# Patient Record
Sex: FEMALE | Race: WHITE | Employment: PART TIME | URBAN - METROPOLITAN AREA
[De-identification: names, ages, dates, MRNs, and addresses within clinical notes are randomized per-mention and may not be internally consistent; named-entity substitution may affect disease eponyms.]

---

## 2021-02-18 ENCOUNTER — IMMUNIZATION (OUTPATIENT)
Dept: FAMILY MEDICINE | Facility: CLINIC | Age: 31
End: 2021-02-18
Payer: COMMERCIAL

## 2021-02-18 PROCEDURE — 0001A PR COVID VAC PFIZER DIL RECON 30 MCG/0.3 ML IM: CPT

## 2021-02-18 PROCEDURE — 91300 PR COVID VAC PFIZER DIL RECON 30 MCG/0.3 ML IM: CPT

## 2021-02-28 ENCOUNTER — HEALTH MAINTENANCE LETTER (OUTPATIENT)
Age: 31
End: 2021-02-28

## 2021-10-03 ENCOUNTER — HEALTH MAINTENANCE LETTER (OUTPATIENT)
Age: 31
End: 2021-10-03

## 2022-03-20 ENCOUNTER — HEALTH MAINTENANCE LETTER (OUTPATIENT)
Age: 32
End: 2022-03-20

## 2022-04-07 ENCOUNTER — TRANSFERRED RECORDS (OUTPATIENT)
Dept: MULTI SPECIALTY CLINIC | Facility: CLINIC | Age: 32
End: 2022-04-07

## 2022-04-07 LAB
HPV ABSTRACT: NORMAL
PAP-ABSTRACT: NORMAL

## 2022-09-10 ENCOUNTER — HEALTH MAINTENANCE LETTER (OUTPATIENT)
Age: 32
End: 2022-09-10

## 2023-04-30 ENCOUNTER — HEALTH MAINTENANCE LETTER (OUTPATIENT)
Age: 33
End: 2023-04-30

## 2023-10-09 ENCOUNTER — VIRTUAL VISIT (OUTPATIENT)
Dept: FAMILY MEDICINE | Facility: CLINIC | Age: 33
End: 2023-10-09
Payer: COMMERCIAL

## 2023-10-09 VITALS — HEIGHT: 60 IN

## 2023-10-09 DIAGNOSIS — Z34.90 SUPERVISION OF NORMAL PREGNANCY: Primary | ICD-10-CM

## 2023-10-09 PROCEDURE — 99207 PR NO CHARGE NURSE ONLY: CPT | Mod: 93

## 2023-10-09 RX ORDER — ALPRAZOLAM 0.25 MG
1 TABLET ORAL
COMMUNITY
Start: 2023-08-08 | End: 2023-11-13

## 2023-10-09 RX ORDER — CHOLECALCIFEROL (VITAMIN D3) 1250 MCG
CAPSULE ORAL
COMMUNITY
Start: 2023-03-13 | End: 2023-11-13

## 2023-10-09 RX ORDER — ONDANSETRON 4 MG/1
4 TABLET, FILM COATED ORAL
COMMUNITY
Start: 2023-01-05 | End: 2023-11-13

## 2023-10-09 RX ORDER — LOSARTAN POTASSIUM 50 MG/1
1 TABLET ORAL
COMMUNITY
Start: 2023-07-10 | End: 2023-11-13

## 2023-10-09 RX ORDER — PROPRANOLOL HYDROCHLORIDE 10 MG/1
1 TABLET ORAL
COMMUNITY
Start: 2023-07-03 | End: 2023-11-13

## 2023-10-09 RX ORDER — BUPROPION HYDROCHLORIDE 75 MG/1
75 TABLET ORAL
COMMUNITY
Start: 2023-03-03 | End: 2023-11-13

## 2023-10-09 RX ORDER — NIFEDIPINE 30 MG/1
30 TABLET, EXTENDED RELEASE ORAL
COMMUNITY
Start: 2023-08-03 | End: 2024-01-17

## 2023-10-09 RX ORDER — ATENOLOL 25 MG/1
1 TABLET ORAL DAILY
COMMUNITY
Start: 2023-07-27 | End: 2024-01-17

## 2023-10-09 RX ORDER — FLUOXETINE 40 MG/1
1 CAPSULE ORAL EVERY MORNING
COMMUNITY
Start: 2023-01-05

## 2023-10-09 NOTE — PATIENT INSTRUCTIONS
Learning About Pregnancy  Your Care Instructions     Your health in the early weeks of your pregnancy is particularly important for your baby's health. Take good care of yourself. Anything you do that harms your body can also harm your baby.  Make sure to go to all of your doctor appointments. Regular checkups will help keep you and your baby healthy.  How can you care for yourself at home?  Diet    Eat a balanced diet. Make sure your diet includes plenty of beans, peas, and leafy green vegetables.     Do not skip meals or go for many hours without eating. If you are nauseated, try to eat a small, healthy snack every 2 to 3 hours.     Do not eat fish that has a high level of mercury, such as shark, swordfish, or mackerel. Do not eat more than one can of tuna each week.     Drink plenty of fluids. If you have kidney, heart, or liver disease and have to limit fluids, talk with your doctor before you increase the amount of fluids you drink.     Cut down on caffeine, such as coffee, tea, and cola.     Do not drink alcohol, such as beer, wine, or hard liquor.     Take a multivitamin that contains at least 400 micrograms (mcg) of folic acid to help prevent birth defects. Fortified cereal and whole wheat bread are good additional sources of folic acid.     Increase the calcium in your diet. Try to drink a quart of skim milk each day. You may also take calcium supplements and choose foods such as cheese and yogurt.   Lifestyle    Make sure you go to your follow-up appointments.     Get plenty of rest. You may be unusually tired while you are pregnant.     Get at least 30 minutes of exercise on most days of the week. Walking is a good choice. If you have not exercised in the past, start out slowly. Take several short walks each day.     Do not smoke. If you need help quitting, talk to your doctor about stop-smoking programs. These can increase your chances of quitting for good.     Do not touch cat feces or litter boxes.  Also, wash your hands after you handle raw meat, and fully cook all meat before you eat it. Wear gloves when you work in the yard or garden, and wash your hands well when you are done. Cat feces, raw or undercooked meat, and contaminated dirt can cause an infection that may harm your baby or lead to a miscarriage.     Avoid things that can make your body too hot and may be harmful to your baby, such as a hot tub or sauna. Or talk with your doctor before doing anything that raises your body temperature. Your doctor can tell you if it's safe.     Avoid chemical fumes, paint fumes, or poisons.     Do not use illegal drugs, marijuana, or alcohol.   Medicines    Review all of your medicines with your doctor. Some of your routine medicines may need to be changed to protect your baby.     Use acetaminophen (Tylenol) to relieve minor problems, such as a mild headache or backache or a mild fever with cold symptoms. Do not use nonsteroidal anti-inflammatory drugs (NSAIDs), such as ibuprofen (Advil, Motrin) or naproxen (Aleve), unless your doctor says it is okay.     Do not take two or more pain medicines at the same time unless the doctor told you to. Many pain medicines have acetaminophen, which is Tylenol. Too much acetaminophen (Tylenol) can be harmful.     Take your medicines exactly as prescribed. Call your doctor if you think you are having a problem with your medicine.   To manage morning sickness    If you feel sick when you first wake up, try eating a small snack (such as crackers) before you get out of bed. Allow some time to digest the snack, and then get out of bed slowly.     Do not skip meals or go for long periods without eating. An empty stomach can make nausea worse.     Eat small, frequent meals instead of three large meals each day.     Drink plenty of fluids.     Eat foods that are high in protein but low in fat.     If you are taking iron supplements, ask your doctor if they are necessary. Iron can make  "nausea worse.     Avoid any smells, such as coffee, that make you feel sick.     Get lots of rest. Morning sickness may be worse when you are tired.   Follow-up care is a key part of your treatment and safety. Be sure to make and go to all appointments, and call your doctor if you are having problems. It's also a good idea to know your test results and keep a list of the medicines you take.  Where can you learn more?  Go to https://www.Modulus.net/patiented  Enter E868 in the search box to learn more about \"Learning About Pregnancy.\"  Current as of: November 9, 2022               Content Version: 13.7    1800-4907 [employer].   Care instructions adapted under license by your healthcare professional. If you have questions about a medical condition or this instruction, always ask your healthcare professional. [employer] disclaims any warranty or liability for your use of this information.      Weeks 6 to 10 of Your Pregnancy: Care Instructions  During these weeks of pregnancy, your body goes through many changes. You may start to feel different, both in your body and your emotions. Each pregnancy is different, so there's no \"right\" way to feel. These early weeks are a time to make healthy choices for you and your pregnancy.    Take a daily prenatal vitamin. Choose one with folic acid in it.   Avoid alcohol, tobacco, and drugs (including marijuana). If you need help quitting, talk to your doctor.     Drink plenty of liquids.  Be sure to drink enough water. And limit sodas, other sweetened drinks, and caffeine.     Choose foods that are good sources of calcium, iron, and folate.  You can try dairy products, dark leafy greens, fortified orange juice and cereals, almonds, broccoli, dried fruit, and beans.     Avoid foods that may be harmful.  Don't eat raw meat, deli meat, raw seafood, or raw eggs. Avoid soft cheese and unpasteurized dairy, like Brie and blue cheese. And don't eat fish that " "contains a lot of mercury, like shark and swordfish.     Don't touch olu litter or cat poop.  They can cause an infection that could be harmful during pregnancy.     Avoid things that can make your body too hot.  For example, avoid hot tubs and saunas.     Soothe morning sickness.  Try eating 5 or 6 small meals a day, getting some fresh air, or using christa to control symptoms.     Ask your doctor about flu and COVID-19 shots.  Getting them can help protect against infection.   Follow-up care is a key part of your treatment and safety. Be sure to make and go to all appointments, and call your doctor if you are having problems. It's also a good idea to know your test results and keep a list of the medicines you take.  Where can you learn more?  Go to https://[email or web address].Garmor/patiented  Enter G112 in the search box to learn more about \"Weeks 6 to 10 of Your Pregnancy: Care Instructions.\"  Current as of: November 9, 2022               Content Version: 13.7 2006-2023 [employer].   Care instructions adapted under license by your healthcare professional. If you have questions about a medical condition or this instruction, always ask your healthcare professional. [employer] disclaims any warranty or liability for your use of this information.         Managing Morning Sickness (01:55)  Your health professional recommends that you watch this short online health video.  Learn tips for dealing with morning sickness, no matter what time of day you have it.  Purpose:  Gives tips for managing morning sickness, including eating small low-fat meals and avoiding caffeine and spicy food.  Goal:  The user will learn tips for dealing with morning sickness during pregnancy.     How to watch the video    Scan the QR code   OR Visit the website    [email or web address]   Current as of: November 9, 2022               Content Version: 13.7 2006-2023 [employer].   Care instructions " adapted under license by your healthcare professional. If you have questions about a medical condition or this instruction, always ask your healthcare professional. Healthwise, [employer] disclaims any warranty or liability for your use of this information.      Pregnancy and Heartburn: Care Instructions  Overview     Heartburn is a common problem during pregnancy.  Heartburn happens when stomach acid backs up into the tube that carries food to the stomach. This tube is called the esophagus. Early in pregnancy, heartburn is caused by hormone changes that slow down digestion. Later on, it's also caused by the large uterus pushing up on the stomach.  Even though you can't fix the cause, there are things you can do to get relief. Treating heartburn during pregnancy focuses first on making lifestyle changes, like changing what and how you eat, and on taking medicines.  Heartburn usually improves or goes away after childbirth.  Follow-up care is a key part of your treatment and safety. Be sure to make and go to all appointments, and call your doctor if you are having problems. It's also a good idea to know your test results and keep a list of the medicines you take.  How can you care for yourself at home?  Eat small, frequent meals.  Avoid foods that make your symptoms worse, such as chocolate, peppermint, and spicy foods. Avoid drinks with caffeine, such as coffee, tea, and sodas.  Avoid bending over or lying down after meals.  Take a short walk after you eat.  If heartburn is a problem at night, do not eat for 2 hours before bedtime.  Take antacids like Mylanta, Maalox, Rolaids, or Tums. Do not take antacids that have sodium bicarbonate, magnesium trisilicate, or aspirin. Be careful when you take over-the-counter antacid medicines. Many of these medicines have aspirin in them. While you are pregnant, do not take aspirin or medicines that contain aspirin unless your doctor says it is okay.  If you're not getting  "relief, talk to your doctor. You may be able to take a stronger acid-reducing medicine.  When should you call for help?   Call your doctor now or seek immediate medical care if:    You have new or worse belly pain.     You are vomiting.   Watch closely for changes in your health, and be sure to contact your doctor if:    You have new or worse symptoms of reflux.     You are losing weight.     You have trouble or pain swallowing.     You do not get better as expected.   Where can you learn more?  Go to https://www.Zannel.net/patiented  Enter U946 in the search box to learn more about \"Pregnancy and Heartburn: Care Instructions.\"  Current as of: November 9, 2022               Content Version: 13.7 2006-2023 [employer].   Care instructions adapted under license by your healthcare professional. If you have questions about a medical condition or this instruction, always ask your healthcare professional. [employer] disclaims any warranty or liability for your use of this information.      Constipation: Care Instructions  Overview     Constipation means that you have a hard time passing stools (bowel movements). People pass stools from 3 times a day to once every 3 days. What is normal for you may be different. Constipation may occur with pain in the rectum and cramping. The pain may get worse when you try to pass stools. Sometimes there are small amounts of bright red blood on toilet paper or the surface of stools. This is because of enlarged veins near the rectum (hemorrhoids).  A few changes in your diet and lifestyle may help you avoid ongoing constipation. Your doctor may also prescribe medicine to help loosen your stool.  Some medicines can cause constipation. These include pain medicines and antidepressants. Tell your doctor about all the medicines you take. Your doctor may want to make a medicine change to ease your symptoms.  Follow-up care is a key part of your treatment and " "safety. Be sure to make and go to all appointments, and call your doctor if you are having problems. It's also a good idea to know your test results and keep a list of the medicines you take.  How can you care for yourself at home?  Drink plenty of fluids. If you have kidney, heart, or liver disease and have to limit fluids, talk with your doctor before you increase the amount of fluids you drink.  Include high-fiber foods in your diet each day. These include fruits, vegetables, beans, and whole grains.  Get at least 30 minutes of exercise on most days of the week. Walking is a good choice. You also may want to do other activities, such as running, swimming, cycling, or playing tennis or team sports.  Take a fiber supplement, such as Citrucel or Metamucil, every day. Read and follow all instructions on the label.  Schedule time each day for a bowel movement. A daily routine may help. Take your time having a bowel movement, but don't sit for more than 10 minutes at a time. And don't strain too much.  Support your feet with a small step stool when you sit on the toilet. This helps flex your hips and places your pelvis in a squatting position.  Your doctor may recommend an over-the-counter laxative to relieve your constipation. Examples are Milk of Magnesia and MiraLax. Read and follow all instructions on the label. Do not use laxatives on a long-term basis.  When should you call for help?   Call your doctor now or seek immediate medical care if:    You have new or worse belly pain.     You have new or worse nausea or vomiting.     You have blood in your stools.   Watch closely for changes in your health, and be sure to contact your doctor if:    Your constipation is getting worse.     You do not get better as expected.   Where can you learn more?  Go to https://www.healthwise.net/patiented  Enter P343 in the search box to learn more about \"Constipation: Care Instructions.\"  Current as of: March 22, " "2023               Content Version: 13.7 2006-2023 [employer].   Care instructions adapted under license by your healthcare professional. If you have questions about a medical condition or this instruction, always ask your healthcare professional. [employer] disclaims any warranty or liability for your use of this information.      Learning About High-Iron Foods  What foods are high in iron?     The foods you eat contain nutrients, such as vitamins and minerals. Iron is a nutrient. Your body needs the right amount to stay healthy and work as it should. You can use the list below to help you make choices about which foods to eat.  Here are some foods that contain iron. They have 1 to 2 milligrams of iron per serving.  Fruits  Figs (dried), 5 figs  Vegetables  Asparagus (canned), 6 henderson  Amadeo, beet, Swiss chard, or turnip greens, 1 cup  Dried peas, cooked,   cup  Seaweed, spirulina (dried),   cup  Spinach, (cooked)   cup or (raw) 1 cup  Grains  Cereals, fortified with iron, 1 cup  Grits (instant, cooked), fortified with iron,   cup  Meats and other protein foods  Beans (kidney, lima, navy, white), canned or cooked,   cup  Beef or lamb, 3 oz  Chicken giblets, 3 oz  Chickpeas (garbanzo beans),   cup  Liver of beef, lamb, or pork, 3 oz  Oysters (cooked), 3 oz  Sardines (canned), 3 oz  Soybeans (boiled),   cup  Tofu (firm),   cup  Work with your doctor to find out how much of this nutrient you need. Depending on your health, you may need more or less of it in your diet.  Where can you learn more?  Go to [email or web address]  Enter R005 in the search box to learn more about \"Learning About High-Iron Foods.\"  Current as of: March 1, 2023               Content Version: 13.7 2006-2023 [employer].   Care instructions adapted under license by your healthcare professional. If you have questions about a medical condition or this instruction, always ask your " "healthcare professional. [employer], [facility] disclaims any warranty or liability for your use of this information.      Rh Antibodies Screening During Pregnancy: About This Test  What is it?     The Rh antibodies screening test is a blood test. It checks your blood for Rh antibodies. If you have Rh-negative blood and have been exposed to Rh-positive blood, your immune system may make antibodies to attack the Rh-positive blood. When a pregnant woman has these antibodies, it is called Rh sensitization.  Why is this test done?  The Rh antibodies screening test is done during pregnancy to find out if your baby is at risk for Rh disease. This can happen if you have Rh-negative blood and your baby has Rh-positive blood. If your Rh-negative blood mixes with Rh-positive blood, your immune system will make antibodies to attack the Rh-positive blood.  During pregnancy, these antibodies could attach to the baby's red blood cells. This can cause your baby to have serious health problems. The results of this test will help your doctor know how to best care for you and your baby during your pregnancy.  How do you prepare for the test?  In general, there's nothing you have to do before this test, unless your doctor tells you to.  How is the test done?  A health professional uses a needle to take a blood sample, usually from the arm.  What happens after the test?  You will probably be able to go home right away. It depends on the reason for the test.  You can go back to your usual activities right away.  Follow-up care is a key part of your treatment and safety. Be sure to make and go to all appointments, and call your doctor if you are having problems. It's also a good idea to keep a list of the medicines you take. Ask your doctor when you can expect to have your test results.  Where can you learn more?  Go to https://www.[email or web address] Advisor.net/patiented  Enter P722 in the search box to learn more about \"Rh Antibodies Screening " "During Pregnancy: About This Test.\"  Current as of: 2022               Content Version: 13.7    4696-3021 [employer].   Care instructions adapted under license by your healthcare professional. If you have questions about a medical condition or this instruction, always ask your healthcare professional. [employer] disclaims any warranty or liability for your use of this information.      Learning About Preventing Rh Disease  What is Rh disease?     Rh disease can be a serious problem in pregnancy. It happens when substances called antibodies in the mother's blood cause red blood cells in her baby's blood to be destroyed. This can occur when the blood types of a mother and her baby do not match.  All blood has an Rh factor. This is what makes a blood type positive or negative. When you are Rh-negative, your baby may be Rh-negative or Rh-positive. If your baby has Rh-positive blood and it mixes with yours, your body will make antibodies. This is called Rh sensitization.  Most of the time, this is not a problem in a first pregnancy. But in future pregnancies, it could cause Rh disease.  A  with Rh disease has mild anemia and may have jaundice. In severe cases, anemia, jaundice, and swelling can be very dangerous or fatal. Some babies need to be delivered early. Some need special care in the NICU. A very sick baby will need a blood transfusion before or after birth.  Fortunately, Rh sensitization is usually easy to prevent.  That's why it's important to get your Rh status checked in your first trimester. It doesn't cause any warning signs. A blood test is the only way to know if you are Rh-sensitive or are at risk for it.  How can you prevent Rh disease?  If you are Rh-negative, your doctor gives you an Rh immune globulin shot (such as RhoGAM). It helps prevent your body from making the antibodies that attack your baby's red blood cells.  Timing is important. You need the " "shot at certain times during your pregnancy. And you need one anytime there is a chance that your baby's blood might mix with yours. That can happen with certain prenatal tests or when you have pregnancy bleeding, such as:  Right after any pregnancy loss, amniocentesis, or CVS testing.  After turning of a breech baby.  Before and maybe after childbirth. Your doctor gives you a shot around week 28. If your  is Rh-positive, you will have another shot.  Follow-up care is a key part of your treatment and safety. Be sure to make and go to all appointments, and call your doctor if you are having problems. It's also a good idea to know your test results and keep a list of the medicines you take.  Where can you learn more?  Go to https://www.hyaqu.[email or web address]/patiented  Enter W177 in the search box to learn more about \"Learning About Preventing Rh Disease.\"  Current as of: 2022               Content Version: 13.7    4683-5098 [employer].   Care instructions adapted under license by your healthcare professional. If you have questions about a medical condition or this instruction, always ask your healthcare professional. [employer] disclaims any warranty or liability for your use of this information.      Learning About Rh Immunoglobulin Shots  Introduction     An Rh immunoglobulin shot is given to pregnant women who have Rh-negative blood.  You may have Rh-negative blood, and your baby may have Rh-positive blood. If the two types of blood mix, your body will make antibodies. This is called Rh sensitization. Most of the time, this is not a problem the first time you're pregnant. But it could cause problems in future pregnancies.  This shot keeps your body from making the antibodies. You get the shot around 28 weeks of pregnancy. After the birth, your baby's blood is tested. If the blood is Rh positive, you will get another shot. You may also get the shot if you have vaginal bleeding " "while you are pregnant or if you have a miscarriage. These shots protect future pregnancies.  Women with Rh negative blood will need this shot each time they get pregnant.  Example  Rh immunoglobulin (HypRho-D, MICRhoGAM, and RhoGAM)  Possible side effects  Rare side effects may include:  Some mild pain where you got the shot.  A slight fever.  An allergic reaction.  You may have other side effects not listed here. Check the information that comes with your medicine.  What to know about taking this medicine  You may need more than one shot. You may need the shot again:  After amniocentesis, fetal blood sampling, or chorionic villus sampling tests.  If you have bleeding in your second or third trimester.  After turning of a breech baby.  After an injury to the belly while you are pregnant.  After a miscarriage or an .  Before or right after treatment for an ectopic or a partial molar pregnancy.  Tell your doctor if you have any allergies or have had a bad response to medicines in the past.  If you get this shot within 3 months of getting a live-virus vaccine, the vaccine may not work. Your doctor will tell you if you need more vaccine.  Check with your doctor or pharmacist before you use any other medicines. This includes over-the-counter medicines. Make sure your doctor knows all of the medicines, vitamins, herbs, and supplements you take. Taking some medicines at the same time can cause problems.  Where can you learn more?  Go to https://[email or web address].[email or web address].net/patiented  Enter V615 in the search box to learn more about \"Learning About Rh Immunoglobulin Shots.\"  Current as of: 2022               Content Version: 13.7    6310-9871 [email or web address].   Care instructions adapted under license by your healthcare professional. If you have questions about a medical condition or this instruction, always ask your healthcare professional. [email or web address] disclaims any warranty or liability for " your use of this information.      Rubella (Thai Measles): Care Instructions  Overview  Rubella, also called Thai measles or 3-day measles, is a disease caused by a virus. It spreads by coughs, sneezes, and close contact. Rubella usually is mild and does not cause long-term problems. But if you are pregnant and get it, you can give the disease to your unborn baby. This can cause serious birth defects.  While you have rubella, you may get a rash and a mild fever, and the lymph glands in your neck may swell. Older children often have a fever, eye pain, a sore throat, and body aches. You can relieve most symptoms with care at home. Avoid being around others, especially pregnant people, until your rash has been gone for at least 4 days. People who have not had this disease before or have not had the vaccine have the greatest chance of getting the virus.  Follow-up care is a key part of your treatment and safety. Be sure to make and go to all appointments, and call your doctor if you are having problems. It's also a good idea to know your test results and keep a list of the medicines you take.  How can you care for yourself at home?  Drink plenty of fluids. If you have kidney, heart, or liver disease and have to limit fluids, talk with your doctor before you increase the amount of fluids you drink.  Get plenty of rest to help your body heal.  Take an over-the-counter pain medicine, such as acetaminophen (Tylenol), ibuprofen (Advil, Motrin), or naproxen (Aleve), to reduce fever and discomfort. Read and follow all instructions on the label. Do not give aspirin to anyone younger than 20. It has been linked to Reye syndrome, a serious illness.  Do not take two or more pain medicines at the same time unless the doctor told you to. Many pain medicines have acetaminophen, which is Tylenol. Too much acetaminophen (Tylenol) can be harmful.  Try not to scratch the rash. Put cold, wet cloths on the rash to reduce itching.  Do  "not smoke. Smoking can make your symptoms worse. If you need help quitting, talk to your doctor about stop-smoking programs and medicines. These can increase your chances of quitting for good.  Avoid contact with people who have never had rubella and who have not been immunized.  When should you call for help?   Call your doctor now or seek immediate medical care if:    You have a fever with a stiff neck or a severe headache.     You are sensitive to light or feel very sleepy or confused.   Watch closely for changes in your health, and be sure to contact your doctor if:    You do not get better as expected.   Where can you learn more?  Go to https://www.Aptito.net/patiented  Enter B812 in the search box to learn more about \"Rubella (Hebrew Measles): Care Instructions.\"  Current as of: 2022               Content Version: 13.7    4457-1907 [employer].   Care instructions adapted under license by your healthcare professional. If you have questions about a medical condition or this instruction, always ask your healthcare professional. [employer] disclaims any warranty or liability for your use of this information.      Gonorrhea and Chlamydia: About These Tests  What is it?  These tests use a sample of urine or other body fluid to look for the bacteria that cause these sexually transmitted infections (STIs). The fluid sample can come from the cervix, vagina, rectum, throat, or eyes.  Why is this test done?  These tests may be done to:  Find out if symptoms are caused by gonorrhea or chlamydia.  Check people who are at high risk of being infected with gonorrhea or chlamydia.  Retest people several months after they have been treated for gonorrhea or chlamydia.  Check for infection in your  if you had a gonorrhea or chlamydia infection at the time of delivery.  How can you prepare for the test?  If you are going to have a urine test, do not urinate for at least 1 hour " "before the test.  If you think you may have chlamydia or gonorrhea, don't have sexual intercourse until you get your test results. And you may want to have tests for other STIs, such as HIV.  How is the test done?  For a direct sample, a swab is used to collect body fluid from the cervix, vagina, rectum, throat, or eyes. Your doctor may collect the sample. Or you may be given instructions on how to collect your own sample.  For a urine sample, you will collect the urine that comes out when you first start to urinate. Don't wipe the genital area clean before you urinate.  How long does the test take?  The test will take a few minutes.  What happens after the test?  You will be able to go home right away.  You can go back to your usual activities right away.  If you do have an infection, don't have sexual intercourse for 7 days after you start treatment. And your sex partner(s) should also be treated.  Follow-up care is a key part of your treatment and safety. Be sure to make and go to all appointments, and call your doctor if you are having problems. It's also a good idea to keep a list of the medicines you take. Ask your doctor when you can expect to have your test results.  Where can you learn more?  Go to [email or web address]://[email or web address]  Enter K976 in the search box to learn more about \"Gonorrhea and Chlamydia: About These Tests.\"  Current as of: August 2, 2022               Content Version: 13.7    8032-4058 [employer].   Care instructions adapted under license by your healthcare professional. If you have questions about a medical condition or this instruction, always ask your healthcare professional. [employer] disclaims any warranty or liability for your use of this information.      Trichomoniasis: About This Test  What is it?     This test uses a sample of urine or other body fluid to look for the tiny parasite that causes trichomoniasis (also called trich). The fluid sample " can come from the vagina, cervix, or urethra. Your doctor may choose to use one or more of many available tests.  Why is it done?  A trich test may be done to:  Find out if symptoms are caused by trich.  Check people who are at high risk for being infected with trich.  Check after treatment to make sure that the infection is gone.  How do you prepare for the test?  If you are going to have a urine test, do not urinate for at least 1 hour before the test.  How is the test done?  For a direct sample, a swab is used to collect body fluid from the cervix, vagina, or urethra. Your doctor may collect the sample. Or you may be given instructions on how to collect your own sample.  For a urine sample, you will collect the urine that comes out when you first start to urinate. Don't wipe the area clean before you urinate.  How long does the test take?  It will take a few minutes to collect a sample.  What happens after the test?  You can go home right away.  You can go back to your usual activities right away.  You may get the test results the same day or several days later. It depends on the test used.  If you do have an infection, don't have sexual intercourse for 7 days after you start treatment. Your sex partner(s) should also be treated.  Follow-up care is a key part of your treatment and safety. Be sure to make and go to all appointments, and call your doctor if you are having problems. Ask your doctor when you can expect to have your test results.  Current as of: August 2, 2022               Content Version: 13.7    7199-0606 [employer].   Care instructions adapted under license by your healthcare professional. If you have questions about a medical condition or this instruction, always ask your healthcare professional. [employer] disclaims any warranty or liability for your use of this information.      HIV Testing: Care Instructions  Overview     You can get tested for the human  "immunodeficiency virus (HIV). This test checks for HIV antibodies and antigens in your blood. If they are found, the test is positive.  If HIV antibodies or antigens are not found, you may need a repeat test to be sure the results are correct. Or your doctor may want to do a different test.  Follow-up care is a key part of your treatment and safety. Be sure to make and go to all appointments, and call your doctor if you are having problems. It's also a good idea to know your test results and keep a list of the medicines you take.  What do the results mean?  Normal result  A normal result means that no HIV antibodies or antigens were found in your blood. And if you had a test that checked for HIV RNA or DNA, none was found. Normal results are called negative.  You may need more testing to be sure the test results are correct.  Uncertain result  Test results don't clearly show whether you have an HIV infection. This is usually called an indeterminate result. This may happen before HIV antibodies or antigens develop. Or it may happen when some other type of antibody or antigen interferes with the results. If this occurs, you will probably have another test right away.  Abnormal result  An abnormal result means that you have HIV antibodies or antigens in your blood. These results are called positive.  A positive test will be confirmed by another type of test. This is because some tests can cause false-positive results. No one is considered HIV-positive until the result is confirmed by a test that shows HIV RNA or DNA in the person's blood.  If your test result is positive, your doctor will talk to you about starting treatment.  Where can you learn more?  Go to https://www.[employer].net/patiented  Enter T792 in the search box to learn more about \"HIV Testing: Care Instructions.\"  Current as of: October 31, 2022               Content Version: 13.7    4810-1914 [employer].   Care instructions adapted under " license by your healthcare professional. If you have questions about a medical condition or this instruction, always ask your healthcare professional. [employer] disclaims any warranty or liability for your use of this information.      Hepatitis C Virus Tests: About These Tests  What are they?     Hepatitis C virus tests are blood tests that check for substances in the blood that show whether you have hepatitis C now or had it in the past. The tests can also tell you what type of hepatitis C you have and how severe the disease is. This can help your doctor with treatment.  If the tests show that you have long-term hepatitis C, you need to take steps to prevent spreading the disease.  Why are these tests done?  You may need these tests if:  You have symptoms of hepatitis.  You may have been exposed to the virus. You are more likely to have been exposed to the virus if you inject drugs or are exposed to body fluids (such as if you are a health care worker).  You've had other tests that show you have liver problems.  You are 18 to 79 years old.  You have an HIV infection.  The tests also are done to help your doctor decide about your treatment and see how well it works.  How do you prepare for the test?  In general, there's nothing you have to do before this test, unless your doctor tells you to.  How is the test done?  A health professional uses a needle to take a blood sample, usually from the arm.  What happens after these tests?  You will probably be able to go home right away.  You can go back to your usual activities right away.  Follow-up care is a key part of your treatment and safety. Be sure to make and go to all appointments, and call your doctor if you are having problems. It's also a good idea to keep a list of the medicines you take. Ask your doctor when you can expect to have your test results.  Where can you learn more?  Go to https://www.[employer]ia.net/patiented  Enter W551 in the search  "box to learn more about \"Hepatitis C Virus Tests: About These Tests.\"  Current as of: October 31, 2022               Content Version: 13.7    8162-1923 [employer].   Care instructions adapted under license by your healthcare professional. If you have questions about a medical condition or this instruction, always ask your healthcare professional. [employer] disclaims any warranty or liability for your use of this information.      Learning About Fetal Ultrasound Results  What is a fetal ultrasound?     Fetal ultrasound is a test that lets your doctor see an image of your baby. Your doctor learns information about your baby from this picture. You may find out, for example, if you are having a boy or a girl. But the main reason you have this test is to get information about your baby's health.  (You may hear your baby called a fetus. This is a common medical term for a baby that's growing in the mother's uterus.)  What kind of information can you learn from this test?  The findings of an ultrasound fall into two categories, normal and abnormal.  Normal  The fetus is the right size for its age.  The placenta is the expected size and does not cover the cervix.  There is enough amniotic fluid in the uterus.  No birth defects can be seen.  Abnormal  The fetus is small or large for its age.  The placenta covers the cervix.  There is too much or too little amniotic fluid in the uterus.  The fetus may have a birth defect.  What does an abnormal result mean?  Abnormal seems to imply that something is wrong with your baby. But what it means is that the test has shown something the doctor wants to take a closer look at.  And that's what happens next. Your doctor will talk to you about what further test or tests you may need.  What do the results mean?  Some of the things your doctor may see on an abnormal ultrasound include:  Echogenic bowel.  The bowel looks very bright on the screen. This could " mean that there's blood in the bowel. Or it could mean that something is blocking the small bowel.  Increased nuchal translucency.  The ultrasound measures the thickness at the back of the baby's neck. An increase in thickness is sometimes an early sign of Down syndrome.  Increased or decreased amniotic fluid.  The doctor will look for a reason for the level of amniotic fluid and will watch the pregnancy closely as it progresses.  Large ventricles.  Ventricles in the brain look larger than they should. Your doctor may take a closer look at the brain.  Renal pyelectasis/hydronephrosis.  The ultrasound measures the fluid around the kidney. If there is more fluid than expected, there is a chance of urinary tract or kidney problems.  Short long bones.  The ultrasound measures certain arm and leg bones. A long bone (humerus or femur) that is shorter than average could be a sign of Down syndrome.  Subchorionic hemorrhage.  An ultrasound can show bleeding under one of the membranes that surrounds the fetus. Some women don't have symptoms of bleeding. The ultrasound can find this problem when women are not bleeding from their vagina. Women who have this condition have a slightly higher chance of miscarriage.  What do you do now?  Take a deep breath, and let it out. Keep in mind that an abnormal finding on an ultrasound, after it's coupled with more information, may:  Turn out to be nothing.  Turn out to be something mild that won't affect the baby.  Turn out to be something more serious. But if this happens, early diagnosis helps you and your doctor plan treatment options sooner rather than later.  Your medical team is there for you. So are your family and friends. Ask questions, and get the help and support you need.  Follow-up care is a key part of your treatment and safety. Be sure to make and go to all appointments, and call your doctor if you are having problems. It's also a good idea to know your test results and keep  "a list of the medicines you take.  Where can you learn more?  Go to https://www.Stepsss.net/patiented  Enter K451 in the search box to learn more about \"Learning About Fetal Ultrasound Results.\"  Current as of: November 9, 2022               Content Version: 13.7    1758-2512 [employer].   Care instructions adapted under license by your healthcare professional. If you have questions about a medical condition or this instruction, always ask your healthcare professional. [employer] disclaims any warranty or liability for your use of this information.      Learning About Prenatal Visits  Your Care Instructions     Regular prenatal visits are very important during any pregnancy. These quick office visits may seem simple and routine. But they can help you and your baby stay healthy. Your doctor is watching for problems that can only be found by regularly checking you and your baby. The visits also give you and your doctor time to build a good relationship.  Many women have prenatal visits every 4 to 6 weeks until week 28 of pregnancy. Then the visits become more frequent. This is often every 2 to 3 weeks through week 36 of pregnancy. In the final month of pregnancy, you likely will see your doctor every week. You may have a different schedule if you have a medical problem or are a teen.  At different times in your pregnancy, you will have exams and tests. Some are routine. Others are done only when there is a chance of a problem. Everything healthy you do for your body helps your growing baby. Rest when you need it. Eat well, drink plenty of water, and exercise regularly.  What happens during a prenatal visit?  You will have blood pressure checks, along with urine tests. You also may have blood tests. If you need to go to the bathroom while waiting for the doctor, tell the nurse. He or she will give you a sample cup so your urine can be tested.  You will be weighed and have your belly " measured.  Your doctor may listen to your baby's heartbeat with a special stethoscope.  In your second trimester, your doctor will check your blood sugar (glucose tolerance test) for diabetes that can occur during pregnancy. This is gestational diabetes, which can harm your baby.  You will have tests to check for infections that could harm your . These include group B streptococcus and hepatitis B.  Your doctor may do ultrasounds to check for problems. This also checks your baby's position. An ultrasound uses sound waves to produce a picture of your baby.  You may have other tests at any time during your pregnancy.  Use your visits to discuss with your doctor any concerns you have.  How can you care for yourself at home?  Get plenty of rest.  Exercise every day, if your doctor says it is okay. If you have not exercised in the past, start out slowly. Take many short walks each day.  Eat a balanced diet. Make sure your diet includes plenty of beans, peas, and leafy green vegetables.  Drink plenty of fluids. Cut down on drinks with caffeine, such as coffee, tea, and cola. If you have kidney, heart, or liver disease and have to limit fluids, talk with your doctor before you increase the amount of fluids you drink.  Avoid chemical fumes, paint fumes, and poisons. Do not use alcohol, marijuana, or illegal drugs. Do not smoke, vape, or use tobacco. If you need help quitting, talk to your doctor about stop-smoking programs and medicines. These can increase your chances of quitting for good.  Review all of your medicines with your doctor. Some of your routine medicines may need to be changed to protect your baby. Do not stop or start taking any medicines without talking to your doctor first.  Follow-up care is a key part of your treatment and safety. Be sure to make and go to all appointments, and call your doctor if you are having problems. It's also a good idea to know your test results and keep a list of the  "medicines you take.  Where can you learn more?  Go to https://www.healthZalicus.net/patiented  Enter J502 in the search box to learn more about \"Learning About Prenatal Visits.\"  Current as of: November 9, 2022               Content Version: 13.7    6533-6925 [employer].   Care instructions adapted under license by your healthcare professional. If you have questions about a medical condition or this instruction, always ask your healthcare professional. [employer] disclaims any warranty or liability for your use of this information.      Domestic Abuse: Care Instructions  Overview     If you want to save this information but don't think it is safe to take it home, see if a trusted friend can keep it for you. Plan ahead. Know who you can call for help, and memorize the phone number.   Be careful online too. Your online activity may be seen by others. Do not use your personal computer or device to read about this topic. Use a safe computer such as one at work, a friend's house, or a library.    Domestic abuse is different from an argument now and then. It is a pattern of abuse that one person uses to control another person's behavior. It may start with threats and name-calling. Then, it may lead to more serious acts, like pushing and slapping. The abuse also may occur in other areas. For example, the abuser may withhold money or spend a partner's money without their knowledge.  Abuse can cause serious harm. You are more likely to have a long-term health problem from the injuries and stress of living in a violent relationship. People who are sexually abused by their partners have more sexually transmitted infections and unwanted pregnancies. Anyone can be abused in relationships. Anyone who is abused also faces emotional pain.  If you are pregnant, abuse can cause problems such as poor weight gain, infections, and bleeding. Abuse during this time may increase your baby's risk of low birth " "weight, premature birth, and death.  Follow-up care is a key part of your treatment and safety. Be sure to make and go to all appointments, and call your doctor if you are having problems. It's also a good idea to know your test results and keep a list of the medicines you take.  How can you care for yourself at home?  If you do not have a safe place to stay, discuss this with your doctor before you leave.  Have a plan for where to go, how to leave your house, and where to stay in case of an emergency. Do not tell your partner about your plan. Contact:  The National Domestic Violence Hotline toll-free at 1-916.528.6940. They can help you find resources in your area.  Your local police department, hospital, or clinic for information about shelters and safe homes near you.  Talk to a trusted friend or neighbor or a [employer] counselor. Do not feel that you have to hide what happened.  Teach your children how to call for help in an emergency.  Be alert to warning signs, such as threats, heavy alcohol use, or drug use. This can help you avoid danger.  If you can, make sure that there are no guns or other weapons in the house.  When should you call for help?   Call 911 anytime you think you may need emergency care. For example, call if:    You or someone else has just been abused.     You think you or someone else is in danger of being abused.   Watch closely for changes in your health, and be sure to contact your doctor if you have any problems.  Where can you learn more?  Go to https://www.anywayanyday.net/patiented  Enter G282 in the search box to learn more about \"Domestic Abuse: Care Instructions.\"  Current as of: February 27, 2023               Content Version: 13.7    9192-4346 [employer].   Care instructions adapted under license by your healthcare professional. If you have questions about a medical condition or this instruction, always ask your healthcare professional. Healthwise, [employer] " disclaims any warranty or liability for your use of this information.      Domestic Violence Safety Instructions: Care Instructions  Overview     If you want to save this information but don't think it is safe to take it home, see if a trusted friend can keep it for you. Plan ahead. Know who you can call for help, and memorize the phone number.   Be careful online too. Your online activity may be seen by others. Do not use your personal computer or device to read about this topic. Use a safe computer such as one at work, a friend's house, or a library.    When you are abused by a spouse or partner, you can take actions to protect yourself and your children.  You can increase your safety whether you decide to stay with your spouse or partner or you decide to leave. You can also prepare an action plan and kit ahead of time. This will allow you to leave quickly when you decide to. Remember, you cannot stop your partner's abuse, but you can find help for you and your children. No one deserves to be abused.  Follow-up care is a key part of your treatment and safety. Be sure to make and go to all appointments, and call your doctor if you are having problems. It's also a good idea to know your test results and keep a list of the medicines you take.  How can you care for yourself at home?  Make a plan for your safety   If you decide to stay with your abusive spouse or partner, you can do the following to increase your safety:  Decide what works best to keep you safe in an emergency.  Decide who you can call to help you in an emergency.  Decide if you will call the police if you get hurt again. If you can, agree on a signal with your children or neighbor to call the police for you if you need help. You can flash lights or hang something out of a window.  Choose a place to go for a short time if you need to leave home. Memorize the address and phone number.  Learn escape routes out of your home in case you need to leave in a  hurry. Teach your children different ways to get out of the house quickly if they need to.  Take objects that can be used as weapons (guns, knives, hammers) and hide them or lock them up.  Learn the number of a domestic violence shelter. Talk to the people there about how they can help.  Find out about other community resources that can help you.  Take pictures of bruises or other injuries if you can. You can also take pictures of things your abuser has broken.  Teach your children that violence is never okay. Tell them that they do not deserve to be hurt.  Pack a bag   Prepare a kit with things you will need if you leave the house suddenly. You can try to hide this in your house, or you can leave it with a friend or relative you can trust. You should include the following items in the kit:  A set of keys to your house and car.  Emergency phone numbers and addresses. You might also want to have a map and a small flashlight in case you need to leave in the night.  Money such as cash or checks. You can also ask a trusted friend or family member to hold money for you.  Copies of legal documents such as house and car titles or rent receipts, birth certificates, Social Security card, voter registration, marriage and 's licenses, and your children's health records.  Personal items you would need for a few days, such as clothes, a toothbrush, toothpaste, and any medicines you or your children need.  A favorite toy or book for your child or children.  Diapers and bottles, if you have very young children.  Pictures that show signs of abuse and violence. You may also add pictures of your abuser.  If you leave   If you decide to leave, you can take the following steps:  Go to the emergency room at a hospital if you have been hurt.  Ask the police to be with you as you leave. They can protect you as you leave the house.  If you decide to leave secretly, remember that activities can be tracked. Your abuser may still have  "access to your cell phone, email, and credit cards. It may be possible for these to be traced. Always be aware of your surroundings.  Take the kit you have prepared. If this is an emergency, do not worry about gathering up anything. Just leave--your safety is most important.  If your abuser moves out, change the locks on the doors. If you have a security system, change the access code.  When should you call for help?   Call 911 anytime you think you may need emergency care. For example, call if:    You or someone else has just been abused.     You think you or someone else is in danger of being abused.   Watch closely for changes in your health, and be sure to contact your doctor if you have any problems.  Where can you learn more?  Go to https://www.[employer].net/patiented  Enter A752 in the search box to learn more about \"Domestic Violence Safety Instructions: Care Instructions.\"  Current as of: October 20, 2022               Content Version: 13.7    7931-8214 [employer].   Care instructions adapted under license by your healthcare professional. If you have questions about a medical condition or this instruction, always ask your healthcare professional. [employer] disclaims any warranty or liability for your use of this information.      Learning About Domestic Abuse  What is domestic abuse?  Domestic abuse is threats or violent behavior in a personal relationship. It can happen between past or current partners or spouses. It's also called domestic violence or intimate partner violence.  Domestic abuse can affect people of any ethnic group, race, or Buddhism. It can affect teens, adults, or the elderly. And it can happen to people of any sexual identity or social status. But most abuse victims are women.  Abusers use fear, bullying, and threats to control their partners. They control what their partners do, where they go, or who they see. They may act jealous, controlling, or " possessive. These early signs of abuse may happen soon after the start of the relationship. Sometimes it can be hard to notice abuse at first. But after the relationship becomes more serious, the abuse may get worse.  If you are being abused in your relationship, it's important to get help. The abuse is not your fault, and you don't have to face it alone.  Be careful  It may not be safe to take home domestic abuse information like this handout. Some people ask a trusted friend to keep it for them. It's also important to plan ahead and to memorize the phone number of places you can go for help. If you are concerned about your safety, do not use your computer, smartphone, or tablet to read about domestic abuse.   What are the types of domestic abuse?  Abuse can be emotional, physical, or sexual.  Emotional abuse  Emotional abuse is a pattern of threats, insults, or controlling behavior. It includes verbal abuse. It goes beyond healthy disagreements in a relationship. It's a sign of an unhealthy relationship, and it may be against the law.  Do you feel threatened, intimidated, or controlled? Does your partner threaten your children, other family members, or pets?  Does your partner:  Use jokes meant to embarrass or shame you?  Call you names?  Tell you that you are a bad parent or threaten to take away your children?  Threaten to have you or your family members deported?  Control your access to money or other basic needs?  Control what you do, who you see or talk to, or where you go?  Another form of emotional abuse is denying that it is happening. Or the abuser may act like the abuse is no big deal or is your fault.  Sexual abuse  With sexual abuse, abusers may try to convince or force you to have sex. They may force you into sex acts you're not comfortable with. Or they may sexually assault you. Sexual abuse can happen even if you are in a committed relationship.  Physical abuse  Physical abuse means that a partner  hits, kicks, or physically hurts you. Physical abuse that starts with a slap might lead to kicking, shoving, and choking over time. The abuser may also threaten to hurt or kill you.  What problems can domestic abuse lead to?  Domestic abuse can be very dangerous. It can cause serious, repeated injury. It can even lead to death.  All forms of abuse can cause long-term health problems from the stress of a violent relationship. Verbal abuse can lead to sexual and physical abuse.  Abuse causes emotional pain, depression, anxiety, and post-traumatic stress. Sexual abuse can lead to sexually transmitted infections (including HIV/AIDS) and unplanned pregnancy.  Pregnancy can be a very dangerous time for people in abusive relationships. Pregnant people who are abused may have anemia, infections, bleeding, or poor weight gain. Abuse during this time may also increase your baby's risk of low birth weight, premature birth, and death.  It can be hard for some victims of domestic abuse to ask for help or to leave their relationship. You may feel scared, stuck, or not sure what steps to take. But it's important not to ignore abuse. Talking to someone could be the first step to ending the abuse and taking care of your own health and happiness again.  Where can you get help?  Talk to a trusted friend. Find a local advocacy group, or talk to your doctor about the abuse.  Contact the National Domestic Violence Hotline at 2-173-662-LXBD (1-295.567.2745) for more safety tips. They can guide you to groups in your area that can help. Or go to the National Coalition Against Domestic Violence website at www.thehotline.org to learn more.  Domestic violence groups or a counselor in your area can help you make a safety plan for yourself and your children.  When to call for help  Call 911 anytime you think you may need emergency care. For example, call if:  You think that you or someone you know is in danger of being abused.  You have been  "hurt and can't have someone safely take you to emergency care.  You have just been abused.  A family member has just been abused.  Where can you learn more?  Go to https://www.[employer].net/patiented  Enter S665 in the search box to learn more about \"Learning About Domestic Abuse.\"  Current as of: February 27, 2023               Content Version: 13.7    2487-0889 [employer].   Care instructions adapted under license by your healthcare professional. If you have questions about a medical condition or this instruction, always ask your healthcare professional. [employer] disclaims any warranty or liability for your use of this information.      Vaginal Bleeding During Pregnancy: Care Instructions  Overview     It's common to have some vaginal spotting when you are pregnant. In some cases, the bleeding isn't serious. And there aren't any more problems with the pregnancy.  But sometimes bleeding is a sign of a more serious problem. This is more common if the bleeding is heavy or painful. Examples of more serious problems include miscarriage, an ectopic pregnancy, and a problem with the placenta.  You may have to see your doctor again to be sure everything is okay. You may also need more tests to find the cause of the bleeding.  Home treatment may be all you need. But it depends on what is causing the bleeding. Be sure to tell your doctor if you have any new symptoms or if your symptoms get worse.  The doctor has checked you carefully, but problems can develop later. If you notice any problems or new symptoms, get medical treatment right away.  Follow-up care is a key part of your treatment and safety. Be sure to make and go to all appointments, and call your doctor if you are having problems. It's also a good idea to know your test results and keep a list of the medicines you take.  How can you care for yourself at home?  If your doctor prescribed medicines, take them exactly as directed. Call " "your doctor if you think you are having a problem with your medicine.  Do not have vaginal sex until your doctor says it's okay.  Do not put anything in your vagina until your doctor says it's okay.  Ask your doctor about other activities you can or can't do.  Get a lot of rest. Being pregnant can make you tired.  Do not use nonsteroidal anti-inflammatory drugs (NSAIDs), such as ibuprofen (Advil, Motrin), naproxen (Aleve), or aspirin, unless your doctor says it is okay.  When should you call for help?   Call 911 anytime you think you may need emergency care. For example, call if:    You passed out (lost consciousness).     You have severe vaginal bleeding. This means you are soaking through a pad each hour for 2 or more hours.     You have sudden, severe pain in your belly or pelvis.   Call your doctor now or seek immediate medical care if:    You have new or worse vaginal bleeding.     You are dizzy or lightheaded, or you feel like you may faint.     You have pain in your belly, pelvis, or lower back.     You think that you are in labor.     You have a sudden release of fluid from your vagina.     You've been having regular contractions for an hour. This means that you've had at least 8 contractions within 1 hour or at least 4 contractions within 20 minutes, even after you change your position and drink fluids.     You notice that your baby has stopped moving or is moving much less than normal.   Watch closely for changes in your health, and be sure to contact your doctor if you have any problems.  Where can you learn more?  Go to https://www.[employer].net/patiented  Enter N829 in the search box to learn more about \"Vaginal Bleeding During Pregnancy: Care Instructions.\"  Current as of: November 9, 2022               Content Version: 13.7    1872-0149 [employer].   Care instructions adapted under license by your healthcare professional. If you have questions about a medical condition or this " instruction, always ask your healthcare professional. [employer] disclaims any warranty or liability for your use of this information.      Circumcision in Infants: What to Expect at Home  Your Child's Recovery  After circumcision, your baby's penis may look red and swollen. It may have petroleum jelly and gauze on it. The gauze will likely come off when your baby urinates. Follow your doctor's directions about whether to put clean gauze back on your baby's penis or to leave the gauze off. If you need to remove gauze from the penis, use warm water to soak the gauze and gently loosen it.  The doctor may have used a Plastibell device to do the circumcision. If so, your baby will have a plastic ring around the head of the penis. The ring should fall off by itself in 10 to 12 days.  A thin, yellow film may form over the area the day after the procedure. This is part of the normal healing process. It should go away in a few days.  Your baby may seem fussy while the area heals. It may hurt for your baby to urinate. This pain often gets better in 3 or 4 days. But it may last for up to 2 weeks.  Even though your baby's penis will likely start to feel better after 3 or 4 days, it may look worse. The penis often starts to look like it's getting better after about 7 to 10 days.  This care sheet gives you a general idea about how long it will take for your child to recover. But each child recovers at a different pace. Follow the steps below to help your child get better as quickly as possible.  How can you care for your child at home?  Activity    Let your baby rest as much as possible. Sleeping will help with recovery.     You can give your baby a sponge bath the day after surgery. Ask your doctor when it is okay to give your baby a bath.   Medicines    Your doctor will tell you if and when your child can restart any medicines. The doctor will also give you instructions about your child taking any new medicines.      Your doctor may recommend giving your baby acetaminophen (Tylenol) to help with pain after the procedure. Be safe with medicines. Give your child medicines exactly as prescribed. Call your doctor if you think your child is having a problem with a medicine.     Do not give your child two or more pain medicines at the same time unless the doctor told you to. Many pain medicines have acetaminophen, which is Tylenol. Too much acetaminophen (Tylenol) can be harmful.   Circumcision care    Always wash your hands before and after touching the circumcision area.     Gently wash your baby's penis with plain, warm water after each diaper change, and pat it dry. Do not use soap. Don't use hydrogen peroxide or alcohol. They can slow healing.     Do not try to remove the film that forms on the penis. The film will go away on its own.     Put plenty of petroleum jelly (such as Vaseline) on the circumcision area during each diaper change. This will prevent your baby's penis from sticking to the diaper while it heals.     Fasten your baby's diapers loosely so that there is less pressure on the penis while it heals.   Follow-up care is a key part of your child's treatment and safety. Be sure to make and go to all appointments, and call your doctor if your child is having problems. It's also a good idea to know your child's test results and keep a list of the medicines your child takes.  When should you call for help?   Call your doctor now or seek immediate medical care if:    Your baby has a fever over 100.4 F.     Your baby is extremely fussy or irritable, has a high-pitched cry, or refuses to eat.     Your baby does not have a wet diaper within 12 hours after the circumcision.     You find a spot of bleeding larger than a 2-inch [place] from the incision.     Your baby has signs of infection. Signs may include severe swelling; redness; a red streak on the shaft of the penis; or a thick, yellow discharge.   Watch closely for  "changes in your child's health, and be sure to contact your doctor if:    A Plastibell device was used for the circumcision and the ring has not fallen off after 10 to 12 days.   Where can you learn more?  Go to https://www.[employer].net/patiented  Enter S255 in the search box to learn more about \"Circumcision in Infants: What to Expect at Home.\"  Current as of: March 1, 2023               Content Version: 13.7    6409-8167 [employer].   Care instructions adapted under license by your healthcare professional. If you have questions about a medical condition or this instruction, always ask your healthcare professional. [employer] disclaims any warranty or liability for your use of this information.      "

## 2023-10-09 NOTE — PROGRESS NOTES
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  Not known whether this was related to spina bifida or other neural tube defect.  Instructed to discuss with Sarah Egan at 1st OB visit about whether extra folic acid is recommended.

## 2023-10-25 LAB
ABO/RH(D): NORMAL
ANTIBODY SCREEN: NEGATIVE
SPECIMEN EXPIRATION DATE: NORMAL

## 2023-10-26 ENCOUNTER — HOSPITAL ENCOUNTER (OUTPATIENT)
Dept: ULTRASOUND IMAGING | Facility: CLINIC | Age: 33
Discharge: HOME | End: 2023-10-26
Attending: NURSE PRACTITIONER | Admitting: NURSE PRACTITIONER
Payer: COMMERCIAL

## 2023-10-26 ENCOUNTER — LAB (OUTPATIENT)
Dept: LAB | Facility: CLINIC | Age: 33
End: 2023-10-26
Payer: COMMERCIAL

## 2023-10-26 DIAGNOSIS — Z34.90 SUPERVISION OF NORMAL PREGNANCY: ICD-10-CM

## 2023-10-26 LAB
ERYTHROCYTE [DISTWIDTH] IN BLOOD BY AUTOMATED COUNT: 13 % (ref 10–15)
HBA1C MFR BLD: 5.6 %
HBV SURFACE AG SERPL QL IA: NONREACTIVE
HCT VFR BLD AUTO: 38.3 % (ref 35–47)
HCV AB SERPL QL IA: NONREACTIVE
HGB BLD-MCNC: 12.6 G/DL (ref 11.7–15.7)
HIV 1+2 AB+HIV1 P24 AG SERPL QL IA: NONREACTIVE
MCH RBC QN AUTO: 28 PG (ref 26.5–33)
MCHC RBC AUTO-ENTMCNC: 32.9 G/DL (ref 31.5–36.5)
MCV RBC AUTO: 85 FL (ref 78–100)
PLATELET # BLD AUTO: 318 10E3/UL (ref 150–450)
RBC # BLD AUTO: 4.5 10E6/UL (ref 3.8–5.2)
RUBV IGG SERPL QL IA: 4.55 INDEX
RUBV IGG SERPL QL IA: POSITIVE
T PALLIDUM AB SER QL: NONREACTIVE
WBC # BLD AUTO: 8.2 10E3/UL (ref 4–11)

## 2023-10-26 PROCEDURE — 87389 HIV-1 AG W/HIV-1&-2 AB AG IA: CPT

## 2023-10-26 PROCEDURE — 85027 COMPLETE CBC AUTOMATED: CPT

## 2023-10-26 PROCEDURE — 86850 RBC ANTIBODY SCREEN: CPT

## 2023-10-26 PROCEDURE — 36415 COLL VENOUS BLD VENIPUNCTURE: CPT

## 2023-10-26 PROCEDURE — 76801 OB US < 14 WKS SINGLE FETUS: CPT

## 2023-10-26 PROCEDURE — 86901 BLOOD TYPING SEROLOGIC RH(D): CPT

## 2023-10-26 PROCEDURE — 86803 HEPATITIS C AB TEST: CPT

## 2023-10-26 PROCEDURE — 87340 HEPATITIS B SURFACE AG IA: CPT

## 2023-10-26 PROCEDURE — 86900 BLOOD TYPING SEROLOGIC ABO: CPT

## 2023-10-26 PROCEDURE — 86762 RUBELLA ANTIBODY: CPT

## 2023-10-26 PROCEDURE — 86780 TREPONEMA PALLIDUM: CPT

## 2023-10-26 PROCEDURE — 87086 URINE CULTURE/COLONY COUNT: CPT

## 2023-10-26 PROCEDURE — 83036 HEMOGLOBIN GLYCOSYLATED A1C: CPT

## 2023-10-27 LAB — BACTERIA UR CULT: NORMAL

## 2023-11-05 ENCOUNTER — NURSE TRIAGE (OUTPATIENT)
Dept: NURSING | Facility: CLINIC | Age: 33
End: 2023-11-05
Payer: COMMERCIAL

## 2023-11-05 NOTE — TELEPHONE ENCOUNTER
"9w5d  Fourth pregnancy.  Has taken Zofran throughout her previous pregnancies to control nausea and vomiting.    Can't keep anything down.  Vomited x 3 past 1.5 hours.  Disposition is Go to ED now (or pcp triage).    Per the protocol, I paged the on call provider, for second level triage, Dr Nicholson at 12:16 pm    \"Elva BATISTA RN [facility] [phone]  Cristianalhaji Branch, [birth year]  OB patient of family practice NP in [place] triaged for N & V. Fourth pregnancy. Has used Zofran throughout all pregnancies. paging for your recommendation.\"    Per Dr Nicholson, patient needs to try conservative management for her symptoms.  Louise and I hadn't discussed this.     I called Neal back with Dr Nichloson's advice.      Patient needs to speak with her OB provider about the next step.  Louise has tried the conservative management recommended. It hasn't helped. Neal stated understanding. She though, since the protocol, recommended ER (or pcp triage) will go to [place] ER.  Louise understands to talk with her provider.        Reason for Disposition   [1] SEVERE vomiting (e.g., 6 or more times / day) AND [2] present > 8 hours    Additional Information   Negative: Sounds like a life-threatening emergency to the triager   Negative: [1] Vaginal bleeding AND [2] pregnant < 20 weeks   Negative: [1] Abdominal pain AND [2] pregnant < 20 weeks   Negative: [1] Vomiting AND [2] pregnant 20 or more weeks   Negative: Headache is main symptom   Negative: [1] Vomiting AND [2] contains red blood or black (\"coffee ground\") material  (Exception: Few red streaks in vomit that only happened once.)   Negative: [1] Insulin-dependent diabetes (Type I) AND [2] glucose > 400 mg/dl (22 mmol/l)   Negative: Recent head injury (within last 3 days)   Negative: Recent abdominal injury (within last 3 days)   Negative: Severe pain in one eye    Protocols used: Pregnancy - Morning Sickness (Nausea and Vomiting of Pregnancy)-MEMO-  Elva BATISTA RN [facility]     "

## 2023-11-13 ENCOUNTER — TELEPHONE (OUTPATIENT)
Dept: FAMILY MEDICINE | Facility: CLINIC | Age: 33
End: 2023-11-13

## 2023-11-13 ENCOUNTER — PRENATAL OFFICE VISIT (OUTPATIENT)
Dept: FAMILY MEDICINE | Facility: CLINIC | Age: 33
End: 2023-11-13
Payer: COMMERCIAL

## 2023-11-13 ENCOUNTER — MYC MEDICAL ADVICE (OUTPATIENT)
Dept: FAMILY MEDICINE | Facility: CLINIC | Age: 33
End: 2023-11-13

## 2023-11-13 VITALS
DIASTOLIC BLOOD PRESSURE: 62 MMHG | HEIGHT: 60 IN | HEART RATE: 64 BPM | WEIGHT: 175 LBS | RESPIRATION RATE: 14 BRPM | SYSTOLIC BLOOD PRESSURE: 112 MMHG | TEMPERATURE: 97.8 F | BODY MASS INDEX: 34.36 KG/M2 | OXYGEN SATURATION: 98 %

## 2023-11-13 DIAGNOSIS — I10 BENIGN ESSENTIAL HYPERTENSION: ICD-10-CM

## 2023-11-13 DIAGNOSIS — F41.1 GAD (GENERALIZED ANXIETY DISORDER): ICD-10-CM

## 2023-11-13 DIAGNOSIS — O09.90 HIGH-RISK PREGNANCY, UNSPECIFIED TRIMESTER: Primary | ICD-10-CM

## 2023-11-13 PROBLEM — R00.2 PALPITATIONS: Status: ACTIVE | Noted: 2019-01-24

## 2023-11-13 PROCEDURE — 99204 OFFICE O/P NEW MOD 45 MIN: CPT | Performed by: NURSE PRACTITIONER

## 2023-11-13 RX ORDER — ASPIRIN 81 MG/1
81 TABLET ORAL DAILY
COMMUNITY
Start: 2023-11-13 | End: 2024-01-17

## 2023-11-13 RX ORDER — ONDANSETRON 4 MG/1
4 TABLET, FILM COATED ORAL EVERY 8 HOURS PRN
Qty: 30 TABLET | Refills: 3 | Status: SHIPPED | OUTPATIENT
Start: 2023-11-13 | End: 2023-12-13

## 2023-11-13 ASSESSMENT — PATIENT HEALTH QUESTIONNAIRE - PHQ9
10. IF YOU CHECKED OFF ANY PROBLEMS, HOW DIFFICULT HAVE THESE PROBLEMS MADE IT FOR YOU TO DO YOUR WORK, TAKE CARE OF THINGS AT HOME, OR GET ALONG WITH OTHER PEOPLE: VERY DIFFICULT
SUM OF ALL RESPONSES TO PHQ QUESTIONS 1-9: 8
SUM OF ALL RESPONSES TO PHQ QUESTIONS 1-9: 8

## 2023-11-13 NOTE — TELEPHONE ENCOUNTER
Rufina calling from patients insurance trying to get some codes clarify coverage for the NIPS testing. Unable to give her the codes and information that is needed. Number given for coding to see if they would be able to assist with the needed information. Shauna Rodriguez LPN

## 2023-11-13 NOTE — PROGRESS NOTES
Pregnancy risks              1.  chronic hypertension:  well controlled on atenolol and nifedipine.  Recommend weekly antepartum testing at 32 weeks.  Growth us every 4 weeks starting at 28 wk.  Consider delivery 37w to 39w6d.  Has home bp cuff- discussed goal less than 140/90 . Given BP in pregnancy handout/ record.  Was on nifedipine last pregnancy.  Start baby ASA 12-16 weeks.  Will get pre-eclampsia labs with NIPS              2. STEFFI: well controlled on prozac- was on this with her last pregnancy also.                3. Nausea/ vomitting of pregnancy: taking zofran 3 times a day- discussed B6, unisom, benedryl and lifestyle changes    Prenatal care plan              - OB labs:  Blood type A pos,  immune  Rubella, all others are normal              - First trimester screening:  Discussed checking with insurance              - Second trimester screening:  Discussed considering              - Covid 19 vaccine: has had 3 vaccines                SUBJECTIVE:     HPI:    This is a 33 year old female patient,  who presents for her first obstetrical visit.    ANAID: 2024, by Last Menstrual Period.  She is 10w5d weeks.  Her cycles are regular.  Her last menstrual period was normal.   Since her LMP, she has nausea    Additional History:     Have you travelled during the pregnancy?No  Have your sexual partner(s) travelled during the pregnancy?No      HISTORY:   Planned Pregnancy: Yes  Marital Status:   Occupation: Pharmacy technician  Living in Household: Spouse and Children    Past History:  Her past medical history  she is on prozac for STEFFI.  No history of post partum depression.  She had post partum hypertension after delivery of her son- second pregnancy.  She has been on medication for HTN since.         She has a history of   None    Since her last LMP she denies use of alcohol, tobacco and street drugs.    Past medical, surgical, social and family history were reviewed and updated in  "EPIC.      Current Outpatient Medications   Medication    aspirin 81 MG EC tablet    atenolol (TENORMIN) 25 MG tablet    FLUoxetine (PROZAC) 40 MG capsule    NIFEdipine ER OSMOTIC (PROCARDIA XL) 30 MG 24 hr tablet    ondansetron (ZOFRAN) 4 MG tablet    Prenatal Vit-Fe Fumarate-FA (PRENATAL MULTIVITAMIN  PLUS IRON) 27-1 MG TABS     No current facility-administered medications for this visit.       ROS:   12 point review of systems negative other than symptoms noted below or in the HPI.      OBJECTIVE:     EXAM:  /62   Pulse 64   Temp 97.8  F (36.6  C) (Temporal)   Resp 14   Ht 1.53 m (5' 0.24\")   Wt 79.4 kg (175 lb)   LMP 08/30/2023   SpO2 98%   BMI 33.91 kg/m   Body mass index is 33.91 kg/m .    GENERAL: healthy, alert and no distress  EYES: Eyes grossly normal to inspection, PERRL and conjunctivae and sclerae normal  HENT: ear canals and TM's normal, nose and mouth without ulcers or lesions  NECK: no adenopathy, no asymmetry, masses, or scars and thyroid normal to palpation  RESP: lungs clear to auscultation - no rales, rhonchi or wheezes  BREAST: normal without masses, tenderness or nipple discharge and no palpable axillary masses or adenopathy  CV: regular rate and rhythm, normal S1 S2, no S3 or S4, no murmur, click or rub, no peripheral edema and peripheral pulses strong  ABDOMEN: soft, nontender, no hepatosplenomegaly, no masses and bowel sounds normal  MS: no gross musculoskeletal defects noted, no edema  SKIN: no suspicious lesions or rashes  NEURO: Normal strength and tone, mentation intact and speech normal  PSYCH: mentation appears normal, affect normal/bright    ASSESSMENT/PLAN:       ICD-10-CM    1. High-risk pregnancy, unspecified trimester  O09.90 Protein  random urine     Comprehensive metabolic panel (BMP + Alb, Alk Phos, ALT, AST, Total. Bili, TP)     US OB > 14 Weeks     NEISSERIA GONORRHOEA PCR     CHLAMYDIA TRACHOMATIS PCR     OR BEHAV ASSMT W/SCORE & DOCD/STAND INSTRUMENT     " ondansetron (ZOFRAN) 4 MG tablet      2. STEFFI (generalized anxiety disorder)  F41.1       3. Benign essential hypertension  I10           33 year old , 10w5d weeks of pregnancy with ANAID of 2024, by Last Menstrual Period    Discussed as follows:    Routine Prenatal Care:  -Discussed genetic screening options, including sequential and quad testing/AFP, cell-free DNA as well as diagnostic testing including amniocentesis. Patient would like to think about further, will discuss next visit  -Discussed Cystic Fibrosis and SMA carrier screening, patient would like to think about further, will discuss next visit  -Discussed ordered labs and ultrasound,   all questions answered.      -Folder given, outlining exercise, weight gain, schedule of visits, routine and indicated ultrasounds, prenatal vitamins and childbirth education.  Desires delivery at [facility]  Due to high risk pregnancy will have her see MD and she would like to see Dr. Camarena.  Appointments made        Return in 4 weeks for routine OB care    Sarah Egan, IZABEL Egan, NP  Answers submitted by the patient for this visit:  Patient Health Questionnaire (Submitted on 2023)  If you checked off any problems, how difficult have these problems made it for you to do your work, take care of things at home, or get along with other people?: Very difficult  PHQ9 TOTAL SCORE: 8

## 2023-11-13 NOTE — PROGRESS NOTES
Concerns: ***  {OB8-16:176581}  Will schedule anatomy ultrasound.  RTC 4 weeks.      Sarah Egan NP    Answers submitted by the patient for this visit:  Patient Health Questionnaire (Submitted on 11/13/2023)  If you checked off any problems, how difficult have these problems made it for you to do your work, take care of things at home, or get along with other people?: Very difficult  PHQ9 TOTAL SCORE: 8

## 2023-11-15 ENCOUNTER — TELEPHONE (OUTPATIENT)
Dept: FAMILY MEDICINE | Facility: CLINIC | Age: 33
End: 2023-11-15
Payer: COMMERCIAL

## 2023-11-15 NOTE — CONFIDENTIAL NOTE
Please abstract the following data from this visit with this patient into the appropriate field in Epic:    Tests that can be patient reported without a hard copy:    Pap smear done on this date: 04/07/2022 (approximately), by this group: [facility], results were normal.     Other Tests found in the patient's chart through Chart Review/Care Everywhere:        Note to Abstraction: If this section is blank, no results were found via Chart Review/Care Everywhere.

## 2023-11-16 ENCOUNTER — LAB (OUTPATIENT)
Dept: LAB | Facility: CLINIC | Age: 33
End: 2023-11-16
Payer: COMMERCIAL

## 2023-11-16 DIAGNOSIS — O09.90 HIGH-RISK PREGNANCY, UNSPECIFIED TRIMESTER: ICD-10-CM

## 2023-11-16 LAB
ALBUMIN MFR UR ELPH: <6 MG/DL
ALBUMIN SERPL BCG-MCNC: 4.1 G/DL (ref 3.5–5.2)
ALP SERPL-CCNC: 56 U/L (ref 40–150)
ALT SERPL W P-5'-P-CCNC: 13 U/L (ref 0–50)
ANION GAP SERPL CALCULATED.3IONS-SCNC: 14 MMOL/L (ref 7–15)
AST SERPL W P-5'-P-CCNC: 24 U/L (ref 0–45)
BILIRUB SERPL-MCNC: 0.3 MG/DL
BUN SERPL-MCNC: 5.1 MG/DL (ref 6–20)
CALCIUM SERPL-MCNC: 9.4 MG/DL (ref 8.6–10)
CHLORIDE SERPL-SCNC: 103 MMOL/L (ref 98–107)
CREAT SERPL-MCNC: 0.53 MG/DL (ref 0.51–0.95)
CREAT UR-MCNC: 49.3 MG/DL
DEPRECATED HCO3 PLAS-SCNC: 16 MMOL/L (ref 22–29)
EGFRCR SERPLBLD CKD-EPI 2021: >90 ML/MIN/1.73M2
GLUCOSE SERPL-MCNC: 90 MG/DL (ref 70–99)
POTASSIUM SERPL-SCNC: 4.3 MMOL/L (ref 3.4–5.3)
PROT SERPL-MCNC: 7.1 G/DL (ref 6.4–8.3)
PROT/CREAT 24H UR: NORMAL MG/G{CREAT}
SODIUM SERPL-SCNC: 133 MMOL/L (ref 135–145)

## 2023-11-16 PROCEDURE — 36415 COLL VENOUS BLD VENIPUNCTURE: CPT

## 2023-11-16 PROCEDURE — 80053 COMPREHEN METABOLIC PANEL: CPT

## 2023-11-16 PROCEDURE — 84156 ASSAY OF PROTEIN URINE: CPT

## 2023-11-16 PROCEDURE — 87491 CHLMYD TRACH DNA AMP PROBE: CPT

## 2023-11-16 PROCEDURE — 87591 N.GONORRHOEAE DNA AMP PROB: CPT

## 2023-11-17 LAB
C TRACH DNA SPEC QL NAA+PROBE: NEGATIVE
N GONORRHOEA DNA SPEC QL NAA+PROBE: NEGATIVE

## 2023-11-21 LAB — SCANNED LAB RESULT: NORMAL

## 2023-12-10 ENCOUNTER — HEALTH MAINTENANCE LETTER (OUTPATIENT)
Age: 33
End: 2023-12-10

## 2023-12-15 ENCOUNTER — PRENATAL OFFICE VISIT (OUTPATIENT)
Dept: FAMILY MEDICINE | Facility: CLINIC | Age: 33
End: 2023-12-15
Payer: COMMERCIAL

## 2023-12-15 VITALS
BODY MASS INDEX: 33.72 KG/M2 | OXYGEN SATURATION: 97 % | SYSTOLIC BLOOD PRESSURE: 128 MMHG | DIASTOLIC BLOOD PRESSURE: 82 MMHG | HEART RATE: 90 BPM | WEIGHT: 174 LBS | RESPIRATION RATE: 16 BRPM | TEMPERATURE: 97.3 F

## 2023-12-15 DIAGNOSIS — O09.90 SUPERVISION OF HIGH RISK PREGNANCY, ANTEPARTUM: Primary | ICD-10-CM

## 2023-12-15 DIAGNOSIS — F41.1 GAD (GENERALIZED ANXIETY DISORDER): ICD-10-CM

## 2023-12-15 DIAGNOSIS — O10.919 HTN IN PREGNANCY, CHRONIC: ICD-10-CM

## 2023-12-15 PROCEDURE — 99207 PR COMPLICATED OB VISIT: CPT | Performed by: STUDENT IN AN ORGANIZED HEALTH CARE EDUCATION/TRAINING PROGRAM

## 2023-12-15 ASSESSMENT — PAIN SCALES - GENERAL: PAINLEVEL: NO PAIN (0)

## 2023-12-15 NOTE — PROGRESS NOTES
@ 15w3d here for OB visit. No bleeding.  NIPS testing negative, it's a girl!  ANAID 24  Checks BP occasionally.  No highs noted.No issues with htn/BP in prior pregnancies despite cHTN.  Doing well.  No concerns today.  Reportable signs and symptoms discussed.  Will make sure anatomy ultrasound.scheduled         Dating first trimester US 10/26 showing::  CLINICAL HISTORY: Dating and viability.  TECHNIQUE: Transabdominal scans were performed. Endovaginal ultrasound  was performed to better visualize the embryo.     COMPARISON: None.     FINDINGS:  UTERUS: Single normal appearing intrauterine gestation sac.  CRL: measures 14 mm, equals 7 weeks 5 days.  FETAL HEART RATE: 167 bpm.  AMNIOTIC FLUID: Normal.  PLACENTA: Not yet formed. No evidence for sub-chorionic hemorrhage.     RIGHT OVARY: Contains a small probable corpus luteum cyst.  LEFT OVARY: Normal.                                                                      IMPRESSION:   Single living intrauterine gestation at 7 weeks 5 days, EDC 2024.          /82   Pulse 90   Temp 97.3  F (36.3  C) (Temporal)   Resp 16   Wt 78.9 kg (174 lb)   LMP 2023   SpO2 97%   BMI 33.72 kg/m    Gen - well appearing  FHT-140's    A/P   Doing well.  No concerns today.  Prenatal flowsheet information is reviewed.  Discussed triple/quad screening.  She declines testing at this time as NIPS previously completed.  Reportable signs and symptoms discussed.    (O09.90) Supervision of high risk pregnancy, antepartum  (primary encounter diagnosis)  (O10.919) HTN in pregnancy, chronic  Comment:  controlled, atenolol and nifedipine.  32 weeks will recommend start weekly antepartum testing. Growth us q 4 weeks @28 wk.  Consider delivery 37w to 38w6d. Advised periodic bp monitoring, goal 140/90, call if noting elevations, keep log .   baby ASA 12 wga.  Baseline preE labs wnl.                (F41.1) STEFIF (generalized anxiety disorder)  Comment: controlled, cont  prozac.    RTC 1 mo    Niki Camarena, DO

## 2023-12-27 ENCOUNTER — MYC MEDICAL ADVICE (OUTPATIENT)
Dept: FAMILY MEDICINE | Facility: CLINIC | Age: 33
End: 2023-12-27
Payer: COMMERCIAL

## 2023-12-28 ENCOUNTER — NURSE TRIAGE (OUTPATIENT)
Dept: FAMILY MEDICINE | Facility: CLINIC | Age: 33
End: 2023-12-28
Payer: COMMERCIAL

## 2023-12-28 NOTE — TELEPHONE ENCOUNTER
Patient has yellow vaginal discharge for 3 days.  No cramping  No fever.  No pain.  No rashes.    Per protocol patient advised to be seen today. Patient agrees with the plan.    Michelle Salamanca RN on 2023 at 3:56 PM    Reason for Disposition   Abnormal color vaginal discharge (i.e., yellow, green, gray)    Additional Information   Negative: Pregnant 20 or more weeks with vaginal bleeding   Negative: Pregnant < 20 weeks with vaginal bleeding   Negative: Pregnant < 37 weeks (i.e., ) and having contractions or other symptoms of labor   Negative: Pregnant 37 or more weeks (i.e., term) and having contractions or other symptoms of labor   Negative: Leakage of fluid (trickle, gush) from the vagina   Negative: Pregnant 23 or more weeks and baby is moving less today (e.g., kick count < 5 in 1 hour or < 10 in 2 hours)   Negative: Pregnant 24-36 weeks () and pinkish or brownish mucous discharge   Negative: Constant abdominal pain and present > 2 hours   Negative: Intermittent lower abdominal pain lasting > 24 hours   Negative: Patient sounds very sick or weak to the triager   Negative: Yellow or green vaginal discharge and fever   Negative: Genital area looks infected (e.g.,  draining sore, spreading redness)   Negative: Painful rash with tiny water blisters   Negative: Rash (e.g., redness, tiny bumps, sore) of genital area   Negative: Patient wants to be seen    Protocols used: Pregnancy - Vaginal [id number]

## 2023-12-29 ENCOUNTER — E-VISIT (OUTPATIENT)
Dept: URGENT CARE | Facility: CLINIC | Age: 33
End: 2023-12-29
Payer: COMMERCIAL

## 2023-12-29 ENCOUNTER — LAB (OUTPATIENT)
Dept: LAB | Facility: CLINIC | Age: 33
End: 2023-12-29
Payer: COMMERCIAL

## 2023-12-29 DIAGNOSIS — B96.89 BV (BACTERIAL VAGINOSIS): Primary | ICD-10-CM

## 2023-12-29 DIAGNOSIS — N89.8 VAGINAL DISCHARGE: Primary | ICD-10-CM

## 2023-12-29 DIAGNOSIS — N76.0 BV (BACTERIAL VAGINOSIS): Primary | ICD-10-CM

## 2023-12-29 DIAGNOSIS — N89.8 VAGINAL DISCHARGE: ICD-10-CM

## 2023-12-29 LAB
CLUE CELLS: PRESENT
TRICHOMONAS, WET PREP: ABNORMAL
WBC'S/HIGH POWER FIELD, WET PREP: ABNORMAL
YEAST, WET PREP: ABNORMAL

## 2023-12-29 PROCEDURE — 99207 PR NON-BILLABLE SERV PER CHARTING: CPT | Performed by: FAMILY MEDICINE

## 2023-12-29 PROCEDURE — 87210 SMEAR WET MOUNT SALINE/INK: CPT

## 2023-12-29 RX ORDER — METRONIDAZOLE 7.5 MG/G
1 GEL VAGINAL DAILY
Qty: 25 G | Refills: 0 | Status: SHIPPED | OUTPATIENT
Start: 2023-12-29 | End: 2024-01-03

## 2023-12-29 NOTE — PATIENT INSTRUCTIONS
Thank you for choosing us for your care. Given your symptoms, I would like you to do a lab-only visit to determine what is causing them.  I have placed the orders.  Please schedule an appointment with the lab right here in [place], or call [phone].  I will let you know when the results are back and next steps to take.

## 2024-01-15 ENCOUNTER — HOSPITAL ENCOUNTER (OUTPATIENT)
Dept: ULTRASOUND IMAGING | Facility: CLINIC | Age: 34
Discharge: HOME | End: 2024-01-15
Attending: NURSE PRACTITIONER | Admitting: NURSE PRACTITIONER
Payer: COMMERCIAL

## 2024-01-15 DIAGNOSIS — O09.90 HIGH-RISK PREGNANCY, UNSPECIFIED TRIMESTER: ICD-10-CM

## 2024-01-15 PROCEDURE — 76805 OB US >/= 14 WKS SNGL FETUS: CPT

## 2024-01-17 ENCOUNTER — PRENATAL OFFICE VISIT (OUTPATIENT)
Dept: FAMILY MEDICINE | Facility: CLINIC | Age: 34
End: 2024-01-17
Payer: COMMERCIAL

## 2024-01-17 VITALS
WEIGHT: 179.25 LBS | HEIGHT: 61 IN | SYSTOLIC BLOOD PRESSURE: 120 MMHG | TEMPERATURE: 97.4 F | BODY MASS INDEX: 33.84 KG/M2 | DIASTOLIC BLOOD PRESSURE: 66 MMHG | RESPIRATION RATE: 18 BRPM | OXYGEN SATURATION: 99 % | HEART RATE: 92 BPM

## 2024-01-17 DIAGNOSIS — O10.919 CHRONIC HYPERTENSION AFFECTING PREGNANCY: Primary | ICD-10-CM

## 2024-01-17 DIAGNOSIS — Z3A.20 20 WEEKS GESTATION OF PREGNANCY: ICD-10-CM

## 2024-01-17 DIAGNOSIS — O10.919 CHRONIC HYPERTENSION IN PREGNANCY: ICD-10-CM

## 2024-01-17 DIAGNOSIS — R11.0 NAUSEA: ICD-10-CM

## 2024-01-17 DIAGNOSIS — Z13.1 SCREENING FOR DIABETES MELLITUS: ICD-10-CM

## 2024-01-17 DIAGNOSIS — O09.90 SUPERVISION OF HIGH RISK PREGNANCY, ANTEPARTUM: Primary | ICD-10-CM

## 2024-01-17 PROCEDURE — 99207 PR PRENATAL VISIT: CPT | Performed by: STUDENT IN AN ORGANIZED HEALTH CARE EDUCATION/TRAINING PROGRAM

## 2024-01-17 RX ORDER — NIFEDIPINE 30 MG/1
30 TABLET, EXTENDED RELEASE ORAL DAILY
Qty: 30 TABLET | Refills: 4 | Status: SHIPPED | OUTPATIENT
Start: 2024-01-17 | End: 2024-06-17

## 2024-01-17 RX ORDER — ASPIRIN 81 MG/1
81 TABLET ORAL DAILY
Qty: 30 TABLET | Refills: 4 | Status: ON HOLD | OUTPATIENT
Start: 2024-01-17 | End: 2024-06-01

## 2024-01-17 RX ORDER — ATENOLOL 25 MG/1
25 TABLET ORAL DAILY
Qty: 30 TABLET | Refills: 4 | Status: SHIPPED | OUTPATIENT
Start: 2024-01-17 | End: 2024-06-14

## 2024-01-17 RX ORDER — ONDANSETRON 4 MG/1
4 TABLET, FILM COATED ORAL EVERY 8 HOURS PRN
Qty: 30 TABLET | Refills: 1 | Status: SHIPPED | OUTPATIENT
Start: 2024-01-17 | End: 2024-02-29

## 2024-01-17 NOTE — PROGRESS NOTES
"SUBJECTIVE:  Louise Branch is a 33 year old  at 20w0d weeks by LMP with an Estimated Date of Delivery: 2024.    She continues to have morning nausea and retching, causes vomiting of stomach acid. Zofran helps. Once she gets past that she is able to eat and does fine the rest of the day.   She denies HA, changes in vision, CP, SOB, abdominal pain/cramping, vaginal bleeding, loss of fluid, peripheral edema, changes in discharge.  Fetal movement is present.  She denies tobacco, EtOH and drugs.    Tdap: at 28 weeks.    OBJECTIVE:  Vitals:    24 0835   BP: 120/66   Pulse: 92   Resp: 18   Temp: 97.4  F (36.3  C)   TempSrc: Temporal   SpO2: 99%   Weight: 81.3 kg (179 lb 4 oz)   Height: 1.537 m (5' 0.5\")        See Ob vitals for exam    US Anatomy >14 weeks (1/15/24)  IMPRESSION:    1. There is a single living intrauterine pregnancy in transverse  presentation.  2. Fetal profile and face were not well seen due to positioning.  Recommend limited short-term follow-up ultrasound to attempt better  visualization.     See prenatal flowsheet  Fundal height: 20 cm  Weight: 179 lb 4 oz  BP: 120/66  Vaginal discharge: normal    ASSESSMENT/PLAN:  (O09.90) Supervision of high risk pregnancy, antepartum  (primary encounter diagnosis)  (Z3A.20) 20 weeks gestation of pregnancy  Comment: cont routine prenatal care. Anatomy US normal aside from need for repeat due to poor view for profile and face. Repeat scheduled for 24.       (O10.919) Chronic hypertension in pregnancy  Comment: Continues on pre-pregnancy medications of atenolol and nifedipine. Prior PCP had switched her to atenolol prior to the pregnancy as she was on propranolol previously, but had issues with BID dosing. BP very well controlled and running wnl both here in clinic and at home. Refills on meds today. Discussed consideration for MFM referral in the setting of chtn in pregnancy and that it would be good to have them on board to have extra eyes " on her as she progresses through the pregnancy. Patient willing and in agreement with this plan.  Plan: MFM Referral-Consult Pregnancy, aspirin 81 MG         EC tablet, atenolol (TENORMIN) 25 MG tablet,         NIFEdipine ER OSMOTIC (PROCARDIA XL) 30 MG 24         hr tablet, MFM Referral-Consult         Pregnancy      (R11.0) Nausea  Comment: AM only and has had zofran prescription that helps get past morning issue and has no further problems throughout the day. Refilling this today. Advised that if worsens or not responsive to zofran would like patient to make clinic aware to permit further eval/management if necessary.  Plan: ondansetron (ZOFRAN) 4 MG tablet    (Z13.1) Screening for diabetes mellitus  Comment: For next visit. Ordering today. Suggested patient arrive earlier than scheduled check in to try and stop in the lab to get her drink to allow for draw after or during her visit.  Plan: Glucose Challenge Test (GCT)1 Hour, (Future)    -Rh: A +  -Hgb: recheck at 28 weeks   -1-Hr GTT: ordered for 24 week visit, if >130 refer for 3 hour GTT       -RTC in as schedule in 4 weeks or sooner with problems    Niki Camarena, DO

## 2024-01-22 ENCOUNTER — PRE VISIT (OUTPATIENT)
Dept: MATERNAL FETAL MEDICINE | Facility: CLINIC | Age: 34
End: 2024-01-22
Payer: COMMERCIAL

## 2024-01-22 ENCOUNTER — HOSPITAL ENCOUNTER (OUTPATIENT)
Dept: ULTRASOUND IMAGING | Facility: CLINIC | Age: 34
Discharge: HOME | End: 2024-01-22
Attending: STUDENT IN AN ORGANIZED HEALTH CARE EDUCATION/TRAINING PROGRAM | Admitting: STUDENT IN AN ORGANIZED HEALTH CARE EDUCATION/TRAINING PROGRAM
Payer: COMMERCIAL

## 2024-01-22 DIAGNOSIS — O09.90 HIGH-RISK PREGNANCY, UNSPECIFIED TRIMESTER: ICD-10-CM

## 2024-01-22 PROCEDURE — 76816 OB US FOLLOW-UP PER FETUS: CPT

## 2024-01-29 ENCOUNTER — OFFICE VISIT (OUTPATIENT)
Dept: MATERNAL FETAL MEDICINE | Facility: CLINIC | Age: 34
End: 2024-01-29
Attending: OBSTETRICS & GYNECOLOGY
Payer: COMMERCIAL

## 2024-01-29 ENCOUNTER — HOSPITAL ENCOUNTER (OUTPATIENT)
Dept: ULTRASOUND IMAGING | Facility: CLINIC | Age: 34
Discharge: HOME | End: 2024-01-29
Attending: OBSTETRICS & GYNECOLOGY
Payer: COMMERCIAL

## 2024-01-29 VITALS — HEART RATE: 68 BPM | SYSTOLIC BLOOD PRESSURE: 118 MMHG | RESPIRATION RATE: 16 BRPM | DIASTOLIC BLOOD PRESSURE: 64 MMHG

## 2024-01-29 DIAGNOSIS — O10.919 CHRONIC HYPERTENSION AFFECTING PREGNANCY: ICD-10-CM

## 2024-01-29 PROCEDURE — 76811 OB US DETAILED SNGL FETUS: CPT | Mod: 26 | Performed by: OBSTETRICS & GYNECOLOGY

## 2024-01-29 PROCEDURE — 99215 OFFICE O/P EST HI 40 MIN: CPT | Mod: 25 | Performed by: OBSTETRICS & GYNECOLOGY

## 2024-01-29 PROCEDURE — 76811 OB US DETAILED SNGL FETUS: CPT

## 2024-01-29 PROCEDURE — G0463 HOSPITAL OUTPT CLINIC VISIT: HCPCS | Mod: 25 | Performed by: OBSTETRICS & GYNECOLOGY

## 2024-01-29 NOTE — NURSING NOTE
Patient presents to [facility] for L2US and [facility] Radiologic Consult at 21w5d due to CHTN well controlled on dual medication. Positive fetal movement. Denies LOF, vaginal bleeding or cramping/contractions. SBAR given to M MD, see their note in Epic.

## 2024-01-29 NOTE — PROGRESS NOTES
Niki Camarena DO  [place], [place]:  I had the pleasure of seeing Ms. Louise Branch in consultation today. Ms. Branch is a pleasant 33-year-old female  at 21w5d by LMP and first trimester US performed at 7 weeks 5 day. She has an ANAID of 2024. She has a diagnosis of CHTN since  after her second delivery. In  during her third pregnancy, she was taking alpha methyldopa for blood pressure management. Since then, she has been on various medications including beta blockers, calcium channel blockers and AT2 receptor blocker with diuretics. In 2021 she was switched to losartan without HCTZ for BP management to allow for concurrent use of propranolol which she was taking to manage anxiety and palpitations. In 2023 she was taking Nifedipine XL 30 mg a day and had been switched from propranolol to atenolol 25 mg a day (prior to pregnancy) for BP management and for suppression of anxiety related symptoms and for management of chronic headache. She has had renal ultrasound and renal vasculature ultrasound which were normal. GDM screen with OGCT was normal.    Was seen in ED at 5 weeks amenorrhea for vaginal bleeding. Prior to this pregnancy had her IUD removed in 2023. Her previous pregnancies were delivered in  G1 37 2/7 weeks vaginally after SOL. Her second pregnancy was G2 delivered in  at 40 0/7 weeks VD after SOL, G3 was delivered in  at 37 4/7 weeks VD. G4 was Sab in 2023 at 5 weeks. This pregnancy is G5 occurred at around the time of diagnosed G4 Sab which was associated with vaginal bleeding raising some uncertainty regarding whether this is the same pregnancy that occurred on a different cycle after IUD removal.    PMH   Past Medical History: Anxiety, Heart palpitations, History hypertension.   FMH:   Family History   Problem Relation Age of Onset    Hypertension Mother     Diabetes Mother         type 2    Anxiety Disorder Mother     Heart Failure Father      Prostate Cancer Father     Cancer Father     Hyperlipidemia Father     Hypertension Father     Cerebrovascular Disease Father     Colon Cancer Father     Skin Cancer Father     Hypertension Sister     Diabetes Sister     Diabetes Sister         gestational    Anxiety Disorder Brother     Heart Failure Maternal Grandmother     Diabetes Maternal Grandmother     Cerebrovascular Disease Maternal Grandmother     Cerebrovascular Disease Maternal Grandfather     No Known Problems Daughter     No Known Problems Daughter     No Known Problems Son    Allergies     Allergen Reactions    Cephalexin     Codeine     Sulfa Antibiotics    LTRASOUND: Reports normal growth and anatomy.  Impression and Recommendations:    CHTN  We have discussed the effects of CHTN on pregnancy, the effects of pregnancy on CHTN and have made the following recommendations based on risk for development of preeclampsia on CHTN, risk for fetal growth restriction; risks of poorly controlled hypertension and need for  surveillance.    Evaluation of severity of disease is based on clinical and laboratory findings. Clinically severity is based on neurological, respiratory, cardiovascular, renal and abdominal findings. Recommend obtaining a repeat maternal ECHO and an EKG to evaluate for evidence of LV hypertrophy secondary to CHTN. The laboratory assessment that was performed at baseline should be repeated at time of development of any new symptoms or signs of disease. The orders for the ECHO and EKG have been placed.  Surveillance is performed both on maternal evolution of disease with development of new symptoms or worsening of current symptoms.  We reviewed common symptoms including headache, visual changes and epigastric or RUQ pain.   Management of blood pressure should include regular blood pressure measurements with values within current range of 120-130 to 80-90 mmHg. The target range for pregnancy of is below 140/90 mmHg. Future need for  higher dose or additional BP medications can be determined depending on BP. The normal progression of blood pressure changes with pregnancy show a rise in the third trimester to pre-pregnancy values or higher. If BP additional medication is required for BP ?160/110 mmHg after reaching maximum dose of nifedipine ER (120 mg a day), consider adding HCTZ (unless preeclampsia is diagnosed).  Lifestyle changes. I have recommended mild to moderate physical activity for at least 30 minutes 5 times a week and maintaining weight gain between 0 and 5 kg for the entire pregnancy. These interventions have been shown to improve outcomes for mother and baby by helping with weight management and decreasing the progression of hypertensive complications during pregnancy. This would also decrease risk for developing symptoms secondary to pseudotumor cerebri  Recommend continuing LDA 81 mg twice a day which has been shown to decrease risk for progression to superimposed preeclampsia. We discussed increased risk secondary to CHTN.  Recommend growth scans every 4 weeks beginning at 28 weeks secondary to CHTN. Recommend initiating antepartum fetal surveillance beginning at 32 weeks with weekly BPP.  Recommendations for delivery include delivery CHTN on meds: 30a9d-93a5o and if CHTN poor control: 19x4u-84y4m. If patient develops superimposed preeclampsia, delivery may occur even earlier depending on severity of disease and according to standard protocols. Preeclampsia without severe features is indication for delivery by 36-37 weeks and with severe features no later than 34 weeks.  Recommend home blood pressure monitoring with a large cuff.    OBESITY  Recommend early screening for GMD and repeat screening at 26-28 weeks.  Recommend that Ms. Branch be evaluated for sleep apnea and if positive should be referred for polysomnography with sleep medicine, if possible, to allow for treatment while it may still improve outcomes.  All women with  BMI ?30 admitted antepartum for an expected stay of 72 hours should receive pharmacologic thromboprophylaxis during stay and after delivery until discharge from hospital.   All women with a BMI ? 35 undergoing  delivery for any indication should receive pharmacologic anticoagulation within 24 hours of delivery for the course of their hospitalization.  All women with BMI ? 40 undergoing vaginal delivery with an anticipated hospital stay of 72 hours should receive pharmacologic thromboprophylaxis for the duration of their hospitalization.      At the conclusion of our conversation, the patient appeared to have a clear grasp and understanding of the content of our conversation based on the appropriate questions that she has asked.    Sincerely,  Manny Galan M.D.  Maternal Fetal Medicine  The patient had all her questions answered. She voiced understanding of the plan of care and her satisfaction with our care today. Thank you for the opportunity to participate in the care of Ms. Branch. Please do not hesitate to contact us if you may have any questions or concerns.    I spent 10 minutes prior to the visit preparing to see the patient (reviewing medical records and tests).    I spent 30 minutes face-face-to-face with the patient during the visit with the majority (>50%) spent on counseling and coordination of care with the patient and/or family members.   I spent 5 minutes after the visit with the patient documenting the visit in the electronic health record and/or communicating with other health care professionals and/or care coordination.    Total time spent on today's date of service: 45 minutes.

## 2024-02-14 ENCOUNTER — PRENATAL OFFICE VISIT (OUTPATIENT)
Dept: FAMILY MEDICINE | Facility: CLINIC | Age: 34
End: 2024-02-14
Payer: MEDICAID

## 2024-02-14 VITALS
BODY MASS INDEX: 34.36 KG/M2 | RESPIRATION RATE: 18 BRPM | HEART RATE: 78 BPM | HEIGHT: 61 IN | SYSTOLIC BLOOD PRESSURE: 122 MMHG | DIASTOLIC BLOOD PRESSURE: 68 MMHG | TEMPERATURE: 97.2 F | OXYGEN SATURATION: 100 % | WEIGHT: 182 LBS

## 2024-02-14 DIAGNOSIS — Z3A.24 24 WEEKS GESTATION OF PREGNANCY: ICD-10-CM

## 2024-02-14 DIAGNOSIS — O09.90 SUPERVISION OF HIGH RISK PREGNANCY, ANTEPARTUM: Primary | ICD-10-CM

## 2024-02-14 DIAGNOSIS — N89.8 VAGINAL DISCHARGE IN PREGNANCY IN SECOND TRIMESTER: ICD-10-CM

## 2024-02-14 DIAGNOSIS — O26.892 VAGINAL DISCHARGE IN PREGNANCY IN SECOND TRIMESTER: ICD-10-CM

## 2024-02-14 DIAGNOSIS — O10.919 CHRONIC HYPERTENSION IN PREGNANCY: ICD-10-CM

## 2024-02-14 DIAGNOSIS — B96.89 BACTERIAL VAGINOSIS IN PREGNANCY: ICD-10-CM

## 2024-02-14 DIAGNOSIS — O23.599 BACTERIAL VAGINOSIS IN PREGNANCY: ICD-10-CM

## 2024-02-14 LAB
CLUE CELLS: PRESENT
GLUCOSE 1H P 50 G GLC PO SERPL-MCNC: 129 MG/DL (ref 70–129)
TRICHOMONAS, WET PREP: ABNORMAL
WBC'S/HIGH POWER FIELD, WET PREP: ABNORMAL
YEAST, WET PREP: ABNORMAL

## 2024-02-14 PROCEDURE — 99207 PR PRENATAL VISIT: CPT | Performed by: STUDENT IN AN ORGANIZED HEALTH CARE EDUCATION/TRAINING PROGRAM

## 2024-02-14 PROCEDURE — 82950 GLUCOSE TEST: CPT | Performed by: STUDENT IN AN ORGANIZED HEALTH CARE EDUCATION/TRAINING PROGRAM

## 2024-02-14 PROCEDURE — 36415 COLL VENOUS BLD VENIPUNCTURE: CPT | Performed by: STUDENT IN AN ORGANIZED HEALTH CARE EDUCATION/TRAINING PROGRAM

## 2024-02-14 PROCEDURE — 87210 SMEAR WET MOUNT SALINE/INK: CPT | Performed by: STUDENT IN AN ORGANIZED HEALTH CARE EDUCATION/TRAINING PROGRAM

## 2024-02-14 RX ORDER — METRONIDAZOLE 500 MG/1
500 TABLET ORAL 2 TIMES DAILY
Qty: 14 TABLET | Refills: 0 | Status: SHIPPED | OUTPATIENT
Start: 2024-02-14 | End: 2024-02-21

## 2024-02-14 ASSESSMENT — PAIN SCALES - GENERAL: PAINLEVEL: NO PAIN (0)

## 2024-02-14 NOTE — PROGRESS NOTES
"SUBJECTIVE:  Louise Branch is a 33 year old  at 24w0d weeks by LMP with an Estimated Date of Delivery: 2024.    Thinks may have BV again. Had in past with Flagyl.  No foul smell or itching.  Will order wet prep and have     MFM recommendations as follows:  CHTN   Repeat maternal ECHO and an EKG to evaluate for evidence of LV hypertrophy secondary to CHTN ordered. Rec rpt lab assessment at time of development of any new symptoms or signs of disease.   Target range BP for pregnancy of is below 140/90 mmHg. Future need for higher dose or additional BP medications can be determined depending on BP. If BP additional medication is required for BP ?160/110 mmHg after reaching maximum dose of nifedipine ER (120 mg a day), consider adding HCTZ (unless preeclampsia is diagnosed).  Continuing LDA 81 mg BID.  Growth scans q 4 weeks beginning at 28 weeks  Weekly BPP starting at 32 week.  Recommendations for delivery include delivery CHTN on meds: 82y1y-59p4s and if CHTN poor control: 33d9u-87y5q. If patient develops superimposed preeclampsia, delivery may occur even earlier depending on severity of disease and according to standard protocols. Preeclampsia without severe features is indication for delivery by 36-37 weeks and with severe features no later than 34 weeks.  Recommend home blood pressure monitoring with a large cuff.    A POS  Dtap option next visit at 28 weeks      /68   Pulse 78   Temp 97.2  F (36.2  C) (Temporal)   Resp 18   Ht 1.54 m (5' 0.63\")   Wt 82.6 kg (182 lb)   LMP 2023   SpO2 100%   BMI 34.81 kg/m     Gen - well appearing  See Ob vitals for exam    A/P   Doing well.  No concerns today.  No vaginal bleeding, no contractions, no leakage of fluid  No nausea/vomiting. No heartburn  No vaginal discharge. No dysuria.   No headache, vision changes, lower extremity swelling, upper abdominal pain, chest pain, shortness of breath    Tdap planned next visit.  Normal anatomy " ultrasound.  GTT today, result pending.   BV +, sending flagyl course.  Discussed kick counts and fetal movement.  Discussed PTL, PROM, and when to call or come in.  Reportable signs and symptoms discussed.  RTC 1 mo or sooner with problems.    Niki Camarena, DO

## 2024-02-29 ENCOUNTER — MYC REFILL (OUTPATIENT)
Dept: FAMILY MEDICINE | Facility: CLINIC | Age: 34
End: 2024-02-29
Payer: COMMERCIAL

## 2024-02-29 DIAGNOSIS — R11.0 NAUSEA: ICD-10-CM

## 2024-02-29 RX ORDER — ONDANSETRON 4 MG/1
4 TABLET, FILM COATED ORAL EVERY 8 HOURS PRN
Qty: 30 TABLET | Refills: 0 | Status: SHIPPED | OUTPATIENT
Start: 2024-02-29 | End: 2024-02-29

## 2024-02-29 RX ORDER — ONDANSETRON 4 MG/1
4 TABLET, FILM COATED ORAL EVERY 8 HOURS PRN
Qty: 30 TABLET | Refills: 0 | Status: SHIPPED | OUTPATIENT
Start: 2024-02-29 | End: 2024-03-18

## 2024-03-17 DIAGNOSIS — R11.0 NAUSEA: ICD-10-CM

## 2024-03-18 RX ORDER — ONDANSETRON 4 MG/1
4 TABLET, FILM COATED ORAL EVERY 8 HOURS PRN
Qty: 30 TABLET | Refills: 0 | Status: SHIPPED | OUTPATIENT
Start: 2024-03-18 | End: 2024-04-01

## 2024-03-25 ENCOUNTER — HOSPITAL ENCOUNTER (OUTPATIENT)
Dept: ULTRASOUND IMAGING | Facility: CLINIC | Age: 34
Discharge: HOME | End: 2024-03-25
Attending: STUDENT IN AN ORGANIZED HEALTH CARE EDUCATION/TRAINING PROGRAM | Admitting: STUDENT IN AN ORGANIZED HEALTH CARE EDUCATION/TRAINING PROGRAM
Payer: COMMERCIAL

## 2024-03-25 ENCOUNTER — PRENATAL OFFICE VISIT (OUTPATIENT)
Dept: FAMILY MEDICINE | Facility: CLINIC | Age: 34
End: 2024-03-25
Payer: COMMERCIAL

## 2024-03-25 VITALS
SYSTOLIC BLOOD PRESSURE: 124 MMHG | BODY MASS INDEX: 35.12 KG/M2 | OXYGEN SATURATION: 99 % | DIASTOLIC BLOOD PRESSURE: 70 MMHG | WEIGHT: 186 LBS | RESPIRATION RATE: 18 BRPM | HEIGHT: 61 IN | TEMPERATURE: 97.8 F | HEART RATE: 96 BPM

## 2024-03-25 DIAGNOSIS — O09.90 SUPERVISION OF HIGH RISK PREGNANCY, ANTEPARTUM: ICD-10-CM

## 2024-03-25 DIAGNOSIS — O10.919 CHRONIC HYPERTENSION IN PREGNANCY: ICD-10-CM

## 2024-03-25 DIAGNOSIS — O09.93 SUPERVISION OF HIGH-RISK PREGNANCY, THIRD TRIMESTER: Primary | ICD-10-CM

## 2024-03-25 PROCEDURE — 76819 FETAL BIOPHYS PROFIL W/O NST: CPT

## 2024-03-25 PROCEDURE — 99207 PR COMPLICATED OB VISIT: CPT | Performed by: STUDENT IN AN ORGANIZED HEALTH CARE EDUCATION/TRAINING PROGRAM

## 2024-03-25 ASSESSMENT — PAIN SCALES - GENERAL: PAINLEVEL: SEVERE PAIN (7)

## 2024-03-25 NOTE — PROGRESS NOTES
Concerns: ***  {OB29-35:433221}  Discussed kick counts and fetal movement.  Discussed PTL, PROM, and when to call or come in.  RTC 2 weeks.    Niki Camarena, DO

## 2024-03-25 NOTE — PROGRESS NOTES
"SUBJECTIVE:  Louise Branch is a 33 year old  at 29w5d weeks by LMP with an Estimated Date of Delivery: 2024.     Doing well. No concerns.   Bps wnl at home and in clinic today.  Only taking ASA every day    Prenatal Labs:   Lab Results   Component Value Date    AS Negative 10/26/2023    HEPBANG Nonreactive 10/26/2023    CHPCRT Negative 2023    GCPCRT Negative 2023    HGB 12.6 10/26/2023     /70   Pulse 96   Temp 97.8  F (36.6  C) (Temporal)   Resp 18   Ht 1.537 m (5' 0.5\")   Wt 84.4 kg (186 lb)   LMP 2023   SpO2 99%   BMI 35.73 kg/m     Gen - well appearing  See flowsheet     A/P   Doing well.  No concerns today.  No vaginal bleeding, no contractions, no leakage of fluid  No nausea/vomiting. No heartburn  No vaginal discharge. No dysuria.   No headache, vision changes, lower extremity swelling, upper abdominal pain, chest pain, shortness of breath     Normal anatomy ultrasound.  GTT passed.  No show last visit, first growth scan today, will cont q4wk  Growth US today c/w dates. Rads also did a BPP, passed BPP today .  Ordering BPPs starting in 2 weeks, weekly through 38 weeks, likely will induce in 39th week.   Discussed kick counts and fetal movement.  Discussed PTL, PROM, and when to call or come in.  Reportable signs and symptoms discussed.  RTC 1 mo or sooner with problems.     Niki Camarena, DO   "

## 2024-03-28 ENCOUNTER — MYC MEDICAL ADVICE (OUTPATIENT)
Dept: FAMILY MEDICINE | Facility: CLINIC | Age: 34
End: 2024-03-28
Payer: COMMERCIAL

## 2024-04-01 ENCOUNTER — HOSPITAL ENCOUNTER (OUTPATIENT)
Dept: ULTRASOUND IMAGING | Facility: CLINIC | Age: 34
Discharge: HOME | End: 2024-04-01
Attending: STUDENT IN AN ORGANIZED HEALTH CARE EDUCATION/TRAINING PROGRAM | Admitting: STUDENT IN AN ORGANIZED HEALTH CARE EDUCATION/TRAINING PROGRAM
Payer: COMMERCIAL

## 2024-04-01 DIAGNOSIS — O09.93 SUPERVISION OF HIGH-RISK PREGNANCY, THIRD TRIMESTER: ICD-10-CM

## 2024-04-01 DIAGNOSIS — R11.0 NAUSEA: ICD-10-CM

## 2024-04-01 DIAGNOSIS — O10.919 CHRONIC HYPERTENSION IN PREGNANCY: ICD-10-CM

## 2024-04-01 PROCEDURE — 76819 FETAL BIOPHYS PROFIL W/O NST: CPT

## 2024-04-01 RX ORDER — ONDANSETRON 4 MG/1
4 TABLET, FILM COATED ORAL EVERY 8 HOURS PRN
Qty: 30 TABLET | Refills: 0 | Status: SHIPPED | OUTPATIENT
Start: 2024-04-01 | End: 2024-04-22

## 2024-04-02 ENCOUNTER — NURSE TRIAGE (OUTPATIENT)
Dept: FAMILY MEDICINE | Facility: CLINIC | Age: 34
End: 2024-04-02
Payer: COMMERCIAL

## 2024-04-02 ENCOUNTER — HOSPITAL ENCOUNTER (OUTPATIENT)
Facility: CLINIC | Age: 34
Discharge: HOME | End: 2024-04-02
Attending: FAMILY MEDICINE | Admitting: FAMILY MEDICINE
Payer: COMMERCIAL

## 2024-04-02 ENCOUNTER — MYC MEDICAL ADVICE (OUTPATIENT)
Dept: FAMILY MEDICINE | Facility: CLINIC | Age: 34
End: 2024-04-02
Payer: COMMERCIAL

## 2024-04-02 VITALS
DIASTOLIC BLOOD PRESSURE: 74 MMHG | TEMPERATURE: 98.1 F | OXYGEN SATURATION: 98 % | RESPIRATION RATE: 16 BRPM | SYSTOLIC BLOOD PRESSURE: 125 MMHG

## 2024-04-02 PROBLEM — Z36.89 ENCOUNTER FOR TRIAGE IN PREGNANT PATIENT: Status: ACTIVE | Noted: 2024-04-02

## 2024-04-02 LAB
ALBUMIN UR-MCNC: NEGATIVE MG/DL
APPEARANCE UR: CLEAR
BACTERIA #/AREA URNS HPF: ABNORMAL /HPF
BILIRUB UR QL STRIP: NEGATIVE
CLUE CELLS: ABNORMAL
COLOR UR AUTO: YELLOW
GLUCOSE UR STRIP-MCNC: NEGATIVE MG/DL
HGB UR QL STRIP: NEGATIVE
KETONES UR STRIP-MCNC: 20 MG/DL
LEUKOCYTE ESTERASE UR QL STRIP: NEGATIVE
MUCOUS THREADS #/AREA URNS LPF: PRESENT /LPF
NITRATE UR QL: NEGATIVE
PH UR STRIP: 6 [PH] (ref 5–7)
RBC URINE: <1 /HPF
SP GR UR STRIP: 1.01 (ref 1–1.03)
SQUAMOUS EPITHELIAL: 2 /HPF
TRICHOMONAS, WET PREP: ABNORMAL
UROBILINOGEN UR STRIP-MCNC: NORMAL MG/DL
WBC URINE: 1 /HPF
WBC'S/HIGH POWER FIELD, WET PREP: ABNORMAL
YEAST, WET PREP: ABNORMAL

## 2024-04-02 PROCEDURE — 81001 URINALYSIS AUTO W/SCOPE: CPT | Performed by: FAMILY MEDICINE

## 2024-04-02 PROCEDURE — 87210 SMEAR WET MOUNT SALINE/INK: CPT | Performed by: FAMILY MEDICINE

## 2024-04-02 PROCEDURE — G0463 HOSPITAL OUTPT CLINIC VISIT: HCPCS

## 2024-04-02 RX ORDER — LIDOCAINE 40 MG/G
CREAM TOPICAL
Status: DISCONTINUED | OUTPATIENT
Start: 2024-04-02 | End: 2024-04-02 | Stop reason: HOSPADM

## 2024-04-02 ASSESSMENT — ACTIVITIES OF DAILY LIVING (ADL)
ADLS_ACUITY_SCORE: 20
ADLS_ACUITY_SCORE: 35

## 2024-04-02 NOTE — PROGRESS NOTES
Dr. D eLa Rosa visited with patient. UA and wet prep ordered. Pt unable to void at this time, oral fluids given and patient will let us know when she has voided.

## 2024-04-02 NOTE — TELEPHONE ENCOUNTER
"Patient had some vaginal bleeding once this morning.  She states her abdomen is constantly tight and then she feels like she is having braxon crandall every hour.  Baby is moving a lot.    Per protocol patient advised to go to labor and delivery. Patient agrees with the plan.    Michelle Salamanca RN on 2024 at 3:15 PM    Reason for Disposition   Contractions and any vaginal bleeding (including: red blood, clots, spotting, or pink/brown mucous)    Additional Information   Negative: Passed out (i.e., fainted, collapsed and was not responding)   Negative: Shock suspected (e.g., cold/pale/clammy skin, too weak to stand, low BP, rapid pulse)   Negative: Difficult to awaken or acting confused (e.g., disoriented, slurred speech)   Negative: SEVERE constant abdominal pain (e.g., excruciating) and present > 1 hour   Negative: SEVERE bleeding (e.g., continuous red blood from vagina, or large blood clots)   Negative: Umbilical cord hanging out of the vagina (shiny, white, curled appearance, \"like telephone cord\")   Negative: Uncontrollable urge to push (i.e., feels like baby is coming out now)   Negative: Can see baby   Negative: Sounds like a life-threatening emergency to the triager   Negative: Pregnant 37 or more weeks (i.e., term)   Negative: MODERATE-SEVERE abdominal pain   Negative: Contractions < 10 minutes apart for 1 hour (i.e., 6 or more contractions an hour)   Negative: Contractions > 10 minutes apart that persist > 24 hours, and no improvement using Care Advice    Protocols used: Pregnancy - Labor - [id number]-TF    "

## 2024-04-02 NOTE — TELEPHONE ENCOUNTER
Left message at home number to call back, [employer] message sent also.    Michelle Salamanca RN on 4/2/2024 at 2:45 PM

## 2024-04-02 NOTE — PROGRESS NOTES
S: Triage Arrival  B: Louise is a 34 y.o.  @ 30w6d who presents with complaint of blood noted when wiping (scant amount per patient). Pt states she has been constipated. No other complaints.   A: EFM applied. FHT's 135 with moderate variability, accels present, no decels noted on strip. Contractions q2-6.   R: Will notify MD to obtain further orders.

## 2024-04-03 NOTE — PROGRESS NOTES
Data: [facility] assessed in [facility] for vaginal bleeding. Cervical exam deferred. Membranes intact. Contractions are present. Contactions are irregular, q2-6 minutes apart, and last  seconds. Uterine assessment is mild by palpation during contractions and soft by palpation at rest. See flowsheets for fetal assessment documentation.     Action: Presumed adequate fetal oxygenation documented. Discharge instructions reviewed. Patient instructed to report change in fetal movement, vaginal leaking of fluid or bleeding, abdominal pain, or any concerns related to the pregnancy to provider/clinic.      Response: Orders to discharge home per Dr. De La Rosa. Patient verbalized understanding of education and agreement with plan. Discharged to home at 1930.

## 2024-04-03 NOTE — DISCHARGE INSTRUCTIONS
OB Triage Discharge Instructions    Diet:  Regular diet as tolerated  Drink 8-10 glasses of water and / or juice every day    Activity:  As tolerated    Other Special Instructions: folow up with primary provider as scheduled     Reason for being seen in L&D: vaginal bleeding    Treatment/procedures performed in L&D: EUM / EFM, UA and wet prep    Call the [facility] at [phone] if you have:  5 or more contractions in one hour  Leaking of fluid from your vaginal area  Decreased fetal movement (follow kick count instructions)  Bleeding from your vaginal area  Swelling in your face, or increased swelling in your hands or legs  Headaches or vision problems such as blurring or seeing spots or starts  Nausea or vomiting lasting for more than 24 hours  An increase in weight (5lbs/week)  Epigastric pain (sometimes confused with heartburn that does not go away or a very bad stomach ache)    If you have any questions, please follow up with your doctor.        Patient Signature: ______________________________________________________________  By signing the above I acknowledge that a nurse or my care provider has explained the instruction to me and I have had any questions regarding my care explained to me.        Discharge Nurse Signature: _______________________________ 4/2/2024  7:25 PM    Method of discharge: ambulatory    Time of discharge: 1930

## 2024-04-10 ENCOUNTER — PRENATAL OFFICE VISIT (OUTPATIENT)
Dept: FAMILY MEDICINE | Facility: CLINIC | Age: 34
End: 2024-04-10
Payer: COMMERCIAL

## 2024-04-10 VITALS
WEIGHT: 186 LBS | TEMPERATURE: 97.2 F | RESPIRATION RATE: 16 BRPM | HEIGHT: 61 IN | DIASTOLIC BLOOD PRESSURE: 74 MMHG | SYSTOLIC BLOOD PRESSURE: 120 MMHG | BODY MASS INDEX: 35.12 KG/M2 | HEART RATE: 73 BPM | OXYGEN SATURATION: 99 %

## 2024-04-10 DIAGNOSIS — Z3A.32 32 WEEKS GESTATION OF PREGNANCY: ICD-10-CM

## 2024-04-10 DIAGNOSIS — O10.919 CHRONIC HYPERTENSION IN PREGNANCY: ICD-10-CM

## 2024-04-10 DIAGNOSIS — O09.93 SUPERVISION OF HIGH-RISK PREGNANCY, THIRD TRIMESTER: Primary | ICD-10-CM

## 2024-04-10 PROCEDURE — 99207 PR COMPLICATED OB VISIT: CPT | Performed by: STUDENT IN AN ORGANIZED HEALTH CARE EDUCATION/TRAINING PROGRAM

## 2024-04-10 ASSESSMENT — PAIN SCALES - GENERAL: PAINLEVEL: NO PAIN (0)

## 2024-04-10 NOTE — PROGRESS NOTES
"Louise Branch is a 33 year old  at 32w0d weeks by LMP with an Estimated Date of Delivery: 2024 here for OB visit.   Baby active, no LOF or VB.   Was seen in triage for some ctx.  No concerns after monitoring, UA jose antonio, discharged.  No issues since.   BP's normal range at home.    Prenatal Labs:   Lab Results   Component Value Date    AS Negative 10/26/2023    HEPBANG Nonreactive 10/26/2023    CHPCRT Negative 2023    GCPCRT Negative 2023    HGB 12.6 10/26/2023     /74   Pulse 73   Temp 97.2  F (36.2  C) (Temporal)   Resp 16   Ht 1.537 m (5' 0.5\")   Wt 84.4 kg (186 lb)   LMP 2023   SpO2 99%   BMI 35.73 kg/m     Gen - well appearing  See flowsheet    A/P   Doing well.  No concerns today.  No vaginal bleeding, no contractions, no leakage of fluid  No nausea/vomiting. No heartburn  No vaginal discharge. No dysuria.   No headache, vision changes, lower extremity swelling, upper abdominal pain, chest pain, shortness of breath   Normal anatomy ultrasound.  GTT passed.  Growth US 3/25 1 day advanced from expected gestational age of 29.5, BPP .  Growth US and BPP planned for  in 33rd week.   Should have one more growth US during week 37 at time of BPP, scheduled on 5/15.   Cont weekly BPPs through 38 weeks, likely will induce in 39th week.   Discussed kick counts and fetal movement.  Discussed PTL, PROM, and when to call or come in.  Reportable signs and symptoms discussed.  RTC 2 wks or sooner with problems.     Niki Camarena, DO     "

## 2024-04-10 NOTE — PROGRESS NOTES
Concerns: ***  {OB29-35:[id number]}  Discussed kick counts and fetal movement.  Discussed PTL, PROM, and when to call or come in.  RTC 2 weeks.    Niki Camarena, DO

## 2024-04-17 ENCOUNTER — HOSPITAL ENCOUNTER (OUTPATIENT)
Dept: ULTRASOUND IMAGING | Facility: CLINIC | Age: 34
Discharge: HOME | End: 2024-04-17
Attending: STUDENT IN AN ORGANIZED HEALTH CARE EDUCATION/TRAINING PROGRAM | Admitting: STUDENT IN AN ORGANIZED HEALTH CARE EDUCATION/TRAINING PROGRAM
Payer: COMMERCIAL

## 2024-04-17 DIAGNOSIS — O10.919 CHRONIC HYPERTENSION IN PREGNANCY: ICD-10-CM

## 2024-04-17 DIAGNOSIS — O09.93 SUPERVISION OF HIGH-RISK PREGNANCY, THIRD TRIMESTER: ICD-10-CM

## 2024-04-17 PROCEDURE — 76819 FETAL BIOPHYS PROFIL W/O NST: CPT

## 2024-04-19 DIAGNOSIS — R11.0 NAUSEA: ICD-10-CM

## 2024-04-22 RX ORDER — ONDANSETRON 4 MG/1
4 TABLET, FILM COATED ORAL EVERY 8 HOURS PRN
Qty: 30 TABLET | Refills: 0 | Status: SHIPPED | OUTPATIENT
Start: 2024-04-22 | End: 2024-05-06

## 2024-04-24 ENCOUNTER — PRENATAL OFFICE VISIT (OUTPATIENT)
Dept: FAMILY MEDICINE | Facility: CLINIC | Age: 34
End: 2024-04-24
Payer: COMMERCIAL

## 2024-04-24 VITALS
SYSTOLIC BLOOD PRESSURE: 122 MMHG | HEIGHT: 61 IN | BODY MASS INDEX: 35.35 KG/M2 | HEART RATE: 78 BPM | OXYGEN SATURATION: 99 % | DIASTOLIC BLOOD PRESSURE: 80 MMHG | RESPIRATION RATE: 18 BRPM | WEIGHT: 187.25 LBS | TEMPERATURE: 97.7 F

## 2024-04-24 DIAGNOSIS — Z3A.34 34 WEEKS GESTATION OF PREGNANCY: ICD-10-CM

## 2024-04-24 DIAGNOSIS — O10.919 CHRONIC HYPERTENSION IN PREGNANCY: ICD-10-CM

## 2024-04-24 DIAGNOSIS — O09.93 SUPERVISION OF HIGH-RISK PREGNANCY, THIRD TRIMESTER: Primary | ICD-10-CM

## 2024-04-24 PROCEDURE — 99207 PR COMPLICATED OB VISIT: CPT | Performed by: STUDENT IN AN ORGANIZED HEALTH CARE EDUCATION/TRAINING PROGRAM

## 2024-04-24 ASSESSMENT — PAIN SCALES - GENERAL: PAINLEVEL: NO PAIN (0)

## 2024-04-24 NOTE — PROGRESS NOTES
Concerns: ***  {OB29-35:[id number]}  Discussed kick counts and fetal movement.  Discussed PTL, PROM, and when to call or come in.  RTC 2 weeks.    Niki Camarena, DO

## 2024-04-24 NOTE — PROGRESS NOTES
"Louise Branch is a 33 year old  by LMP with an Estimated Date of Delivery: 2024 @ 34w0d here for OB visit.   No LOF, or VB. Baby active.   BP's normal range at home.  Torres crandall, no ctx.  No concerns.  Thinks will want to pursue induction at 39 wga.    Prenatal Labs:   Lab Results   Component Value Date    AS Negative 10/26/2023    HEPBANG Nonreactive 10/26/2023    CHPCRT Negative 2023    GCPCRT Negative 2023    HGB 12.6 10/26/2023       /80   Pulse 78   Temp 97.7  F (36.5  C) (Temporal)   Resp 18   Ht 1.545 m (5' 0.83\")   Wt 84.9 kg (187 lb 4 oz)   LMP 2023   SpO2 99%   BMI 35.58 kg/m     Gen - well appearing  See flowsheet    A/P   Doing well.  No concerns today.  Normal anatomy ultrasound.  GTT passed.  GBS next visit at 36 weeks, will also plan first SVE at that time.  Growth US 3/25 1 day advanced from expected gestational age of 29.5.  BPP 17 in 33rd week passed , had also anticipated this would be done as a growth, but appears this was not done.  US appt tomorrow, need both BPP and Growth US at that time.   Should have one more growth US during week 37 at time of BPP, scheduled on 5/15.   Cont weekly BPPs through 38 weeks, likely will induce in 39th week.   Discussed kick counts and fetal movement.  Discussed PTL, PROM, and when to call or come in.  Reportable signs and symptoms discussed.  RTC 2 wks or sooner with problems.     Pregnancy Issues   1. Chronic hypertension in pregnancy - Nifedipine and atenolol, Level 2 normal.   MFM recs:  Rpt echo/EKG if s/sx.  Goal BP <140/<90, if BP >160/110 and nifedipine maxed, consider HCTZ if no preE.  LDA bid, patient taking qD  Growth scans q4w  Weekly BPP starting 32 weeks.  Delivery: Well-cont: 59f0h-95w2v, Poor cont: 37d8p-41q3w.   PreE w/o severe features by 36-37 weeks w/ severe features no later than 34 weeks.     Prenatal care plan              - prior deliveries: no complcations, 3 prior deliveries at " term.  - OB Labs of concern: none   - Rh- A POS              - First trimester screening:  NIPS nl              - Labor pain management:  absolutely!              - Ped:  Sarah Egan              - Birth control: IUD, paragard, had before without issues              - Breast feeding:  no, plans to restart losartan              - Covid 19 vaccine:  done              - influenza vaccine: done              - Tdap will get done at work today.

## 2024-04-25 ENCOUNTER — HOSPITAL ENCOUNTER (OUTPATIENT)
Dept: ULTRASOUND IMAGING | Facility: CLINIC | Age: 34
Discharge: HOME | End: 2024-04-25
Attending: STUDENT IN AN ORGANIZED HEALTH CARE EDUCATION/TRAINING PROGRAM
Payer: COMMERCIAL

## 2024-04-25 DIAGNOSIS — O09.93 SUPERVISION OF HIGH-RISK PREGNANCY, THIRD TRIMESTER: ICD-10-CM

## 2024-04-25 DIAGNOSIS — O10.919 CHRONIC HYPERTENSION IN PREGNANCY: ICD-10-CM

## 2024-04-25 PROCEDURE — 76816 OB US FOLLOW-UP PER FETUS: CPT

## 2024-04-25 PROCEDURE — 76819 FETAL BIOPHYS PROFIL W/O NST: CPT

## 2024-05-01 ENCOUNTER — HOSPITAL ENCOUNTER (OUTPATIENT)
Dept: ULTRASOUND IMAGING | Facility: CLINIC | Age: 34
Discharge: HOME | End: 2024-05-01
Attending: STUDENT IN AN ORGANIZED HEALTH CARE EDUCATION/TRAINING PROGRAM | Admitting: STUDENT IN AN ORGANIZED HEALTH CARE EDUCATION/TRAINING PROGRAM
Payer: COMMERCIAL

## 2024-05-01 DIAGNOSIS — O10.919 CHRONIC HYPERTENSION IN PREGNANCY: ICD-10-CM

## 2024-05-01 DIAGNOSIS — O09.93 SUPERVISION OF HIGH-RISK PREGNANCY, THIRD TRIMESTER: ICD-10-CM

## 2024-05-01 PROCEDURE — 76819 FETAL BIOPHYS PROFIL W/O NST: CPT

## 2024-05-03 DIAGNOSIS — R11.0 NAUSEA: ICD-10-CM

## 2024-05-06 RX ORDER — ONDANSETRON 4 MG/1
4 TABLET, FILM COATED ORAL EVERY 8 HOURS PRN
Qty: 30 TABLET | Refills: 0 | Status: SHIPPED | OUTPATIENT
Start: 2024-05-06 | End: 2024-05-23

## 2024-05-08 ENCOUNTER — HOSPITAL ENCOUNTER (OUTPATIENT)
Dept: ULTRASOUND IMAGING | Facility: CLINIC | Age: 34
Discharge: HOME | End: 2024-05-08
Attending: STUDENT IN AN ORGANIZED HEALTH CARE EDUCATION/TRAINING PROGRAM | Admitting: STUDENT IN AN ORGANIZED HEALTH CARE EDUCATION/TRAINING PROGRAM
Payer: COMMERCIAL

## 2024-05-08 ENCOUNTER — PRENATAL OFFICE VISIT (OUTPATIENT)
Dept: FAMILY MEDICINE | Facility: CLINIC | Age: 34
End: 2024-05-08
Payer: COMMERCIAL

## 2024-05-08 VITALS
OXYGEN SATURATION: 100 % | DIASTOLIC BLOOD PRESSURE: 82 MMHG | HEART RATE: 85 BPM | WEIGHT: 189 LBS | HEIGHT: 61 IN | BODY MASS INDEX: 35.68 KG/M2 | TEMPERATURE: 97.5 F | SYSTOLIC BLOOD PRESSURE: 118 MMHG

## 2024-05-08 DIAGNOSIS — O10.919 CHRONIC HYPERTENSION IN PREGNANCY: ICD-10-CM

## 2024-05-08 DIAGNOSIS — O09.93 SUPERVISION OF HIGH-RISK PREGNANCY, THIRD TRIMESTER: Primary | ICD-10-CM

## 2024-05-08 DIAGNOSIS — O09.93 SUPERVISION OF HIGH-RISK PREGNANCY, THIRD TRIMESTER: ICD-10-CM

## 2024-05-08 PROCEDURE — 76819 FETAL BIOPHYS PROFIL W/O NST: CPT

## 2024-05-08 PROCEDURE — 87653 STREP B DNA AMP PROBE: CPT | Performed by: STUDENT IN AN ORGANIZED HEALTH CARE EDUCATION/TRAINING PROGRAM

## 2024-05-08 PROCEDURE — 99207 PR COMPLICATED OB VISIT: CPT | Performed by: STUDENT IN AN ORGANIZED HEALTH CARE EDUCATION/TRAINING PROGRAM

## 2024-05-08 ASSESSMENT — PAIN SCALES - GENERAL: PAINLEVEL: NO PAIN (0)

## 2024-05-08 NOTE — PROGRESS NOTES
"Louise Branch is a 33 year old  @ 36w0d by LMP with an Estimated Date of Delivery: 2024 here for OB visit.   No LOF, or VB. Baby active.   BP's normal range at home.  Few mild BH, denies ctx.  Denies concerns.  Wants induction at 39 weeks, should be considered mIOL given htn hx.    Prenatal Labs:   Lab Results   Component Value Date    AS Negative 10/26/2023    HEPBANG Nonreactive 10/26/2023    CHPCRT Negative 2023    GCPCRT Negative 2023    HGB 12.6 10/26/2023       /82   Pulse 85   Temp 97.5  F (36.4  C) (Temporal)   Ht 1.537 m (5' 0.5\")   Wt 85.7 kg (189 lb)   LMP 2023   SpO2 100%   BMI 36.30 kg/m     Gen - well appearing  See flowsheet    A/P   Doing well.  No concerns today.  GBS today  Growth US 3/25 1 day advanced from expected gestational age of 29.5.  BPP today passed .  Next week should be Growth US and BPP during week 37 on 5/15.   Cont weekly BPPs through 38 weeks.  Plan induction 39 weeks.  Normal anatomy ultrasound.  GTT passed.  Discussed kick counts and fetal movement.  Discussed PTL, PROM, and when to call or come in.  Reportable signs and symptoms discussed.  RTC 1 wk or sooner with problems.     Pregnancy Issues              1. Chronic hypertension in pregnancy - Nifedipine and atenolol, Level 2 normal.   MFM recs:  Rpt echo/EKG if s/sx.  Goal BP <140/<90, if BP >160/110 and nifedipine maxed, consider HCTZ if no preE.  LDA bid, patient taking qD  Growth scans q4w  Weekly BPP starting 32 weeks.  Delivery: Well-cont: 06c2i-61d7t, Poor cont: 59z5d-14e1d.   PreE w/o severe features by 36-37 weeks w/ severe features no later than 34 weeks.     Prenatal care plan              - prior deliveries: no complcations, 3 prior deliveries at term.  - OB Labs of concern: none              - Rh- A POS              - First trimester screening:  NIPS nl              - Labor pain management:  absolutely!              - Ped:  Sarah Egan              - Birth control: " IUD, paragard, had before without issues              - Breast feeding:  no, plans to restart losartan              - Covid 19 vaccine:  done              - influenza vaccine: done              - Tdap will get done at work today.

## 2024-05-08 NOTE — PROGRESS NOTES
Abdomen , soft, nontender, mildly distended , no guarding or rigidity , no masses palpable , normal bowel sounds , + umbilical hernia (small) Liver and Spleen , no hepatomegaly present , no hepatosplenomegaly , liver nontender , spleen not palpable Rectal deferred Concerns: ***  .  {[id number]}  GBS done today.  Labor precautions discussed.  RTC 1 week.  Prenatal flowsheet information is reviewed.    Niki Camarnea,

## 2024-05-09 LAB — GP B STREP DNA SPEC QL NAA+PROBE: NEGATIVE

## 2024-05-15 ENCOUNTER — PRENATAL OFFICE VISIT (OUTPATIENT)
Dept: FAMILY MEDICINE | Facility: CLINIC | Age: 34
End: 2024-05-15
Payer: COMMERCIAL

## 2024-05-15 ENCOUNTER — HOSPITAL ENCOUNTER (OUTPATIENT)
Dept: ULTRASOUND IMAGING | Facility: CLINIC | Age: 34
Discharge: HOME | End: 2024-05-15
Attending: STUDENT IN AN ORGANIZED HEALTH CARE EDUCATION/TRAINING PROGRAM | Admitting: STUDENT IN AN ORGANIZED HEALTH CARE EDUCATION/TRAINING PROGRAM
Payer: COMMERCIAL

## 2024-05-15 VITALS
SYSTOLIC BLOOD PRESSURE: 118 MMHG | OXYGEN SATURATION: 97 % | DIASTOLIC BLOOD PRESSURE: 80 MMHG | BODY MASS INDEX: 36.79 KG/M2 | HEART RATE: 77 BPM | WEIGHT: 187.38 LBS | RESPIRATION RATE: 18 BRPM | HEIGHT: 60 IN | TEMPERATURE: 97.2 F

## 2024-05-15 DIAGNOSIS — O09.93 SUPERVISION OF HIGH-RISK PREGNANCY, THIRD TRIMESTER: ICD-10-CM

## 2024-05-15 DIAGNOSIS — O09.93 SUPERVISION OF HIGH-RISK PREGNANCY, THIRD TRIMESTER: Primary | ICD-10-CM

## 2024-05-15 DIAGNOSIS — O10.919 CHRONIC HYPERTENSION IN PREGNANCY: ICD-10-CM

## 2024-05-15 DIAGNOSIS — O99.213 MATERNAL OBESITY SYNDROME IN THIRD TRIMESTER: ICD-10-CM

## 2024-05-15 PROCEDURE — 99207 PR COMPLICATED OB VISIT: CPT | Performed by: STUDENT IN AN ORGANIZED HEALTH CARE EDUCATION/TRAINING PROGRAM

## 2024-05-15 PROCEDURE — 76819 FETAL BIOPHYS PROFIL W/O NST: CPT

## 2024-05-15 ASSESSMENT — PAIN SCALES - GENERAL: PAINLEVEL: NO PAIN (0)

## 2024-05-15 NOTE — PROGRESS NOTES
Louise Branch 34 year old  @ 37w0d with an ANAID of 2024, by Last Menstrual Period here for OB visit.  No LOF or VB, baby active.    BP's running wnl at home.  GBS results reviewed: NEGATIVE    /80   Pulse 77   Temp 97.2  F (36.2  C) (Temporal)   Resp 18   Ht 1.524 m (5')   Wt 85 kg (187 lb 6 oz)   LMP 2023   SpO2 97%   BMI 36.59 kg/m     See flowsheet    A/P   Doing well.  No concerns today.  Cervix is minimally changed from previous exam, now fully open to 1 cm.  Cephalic position confirmed by ultrasound.  GBS NEGATIVE  Growth US today appropriate interval growth passed BPP 8/8.  Cont weekly BPPs through 38 weeks.  Plan induction 39 weeks.  Normal anatomy ultrasound.  GTT passed.  Discussed kick counts and fetal movement.  Discussed PTL, PROM, and when to call or come in.  Reportable signs and symptoms discussed.  RTC 1 wk or sooner with problems.     Pregnancy Issues              1. Chronic hypertension in pregnancy - Nifedipine and atenolol, Level 2 normal.   M recs:  Rpt echo/EKG if s/sx.  Goal BP <140/<90, if BP >160/110 and nifedipine maxed, consider HCTZ if no preE.  LDA bid, patient taking qD  Growth scans q4w  Weekly BPP starting 32 weeks.  Delivery: Well-cont: 06g1d-97r0s, Poor cont: 19g8n-77y2g.   PreE w/o severe features by 36-37 weeks w/ severe features no later than 34 weeks.  2. Obesity -     Prenatal care plan              - prior deliveries: no complcations, 3 prior deliveries at term.  - OB Labs of concern: none              - Rh- A POS              - First trimester screening:  NIPS nl              - Labor pain management:  absolutely!              - Ped:  Sarah Egan              - Birth control: IUD, paragard, had before without issues              - Breast feeding:  no, plans to restart losartan              - Covid 19 vaccine:  done              - influenza vaccine: done              - Tdap will get done at work today.

## 2024-05-20 DIAGNOSIS — R11.0 NAUSEA: ICD-10-CM

## 2024-05-20 RX ORDER — ONDANSETRON 4 MG/1
4 TABLET, FILM COATED ORAL EVERY 8 HOURS PRN
Qty: 30 TABLET | Refills: 0 | OUTPATIENT
Start: 2024-05-20

## 2024-05-22 ENCOUNTER — MYC REFILL (OUTPATIENT)
Dept: FAMILY MEDICINE | Facility: CLINIC | Age: 34
End: 2024-05-22

## 2024-05-22 ENCOUNTER — PRENATAL OFFICE VISIT (OUTPATIENT)
Dept: FAMILY MEDICINE | Facility: CLINIC | Age: 34
End: 2024-05-22
Payer: COMMERCIAL

## 2024-05-22 VITALS
TEMPERATURE: 97.9 F | HEART RATE: 85 BPM | HEIGHT: 60 IN | SYSTOLIC BLOOD PRESSURE: 121 MMHG | BODY MASS INDEX: 37.11 KG/M2 | OXYGEN SATURATION: 97 % | WEIGHT: 189 LBS | DIASTOLIC BLOOD PRESSURE: 83 MMHG

## 2024-05-22 DIAGNOSIS — O99.213 MATERNAL OBESITY SYNDROME IN THIRD TRIMESTER: ICD-10-CM

## 2024-05-22 DIAGNOSIS — O09.93 SUPERVISION OF HIGH-RISK PREGNANCY, THIRD TRIMESTER: Primary | ICD-10-CM

## 2024-05-22 DIAGNOSIS — O10.919 CHRONIC HYPERTENSION IN PREGNANCY: ICD-10-CM

## 2024-05-22 DIAGNOSIS — R11.0 NAUSEA: ICD-10-CM

## 2024-05-22 PROCEDURE — 99207 PR COMPLICATED OB VISIT: CPT | Performed by: STUDENT IN AN ORGANIZED HEALTH CARE EDUCATION/TRAINING PROGRAM

## 2024-05-22 ASSESSMENT — PAIN SCALES - GENERAL: PAINLEVEL: MILD PAIN (2)

## 2024-05-22 NOTE — PROGRESS NOTES
Louise Branch 34 year old  @ 38w0d with an Estimated Date of Delivery: 2024, by Last Menstrual Period here for OB visit.  No bleeding or LOF.   NO major BH or ctx activity.  No concerns.  BP's wnl at home, would like to consider induction at next visit if doesn't go into labor before.    Prenatal Labs:   Lab Results   Component Value Date    AS Negative 10/26/2023    HEPBANG Nonreactive 10/26/2023    CHPCRT Negative 2023    GCPCRT Negative 2023    HGB 12.6 10/26/2023        /86   Pulse 85   Temp 97.9  F (36.6  C) (Temporal)   Ht 1.524 m (5')   Wt 85.7 kg (189 lb)   LMP 2023   SpO2 97%   BMI 36.91 kg/m     See flowsheet for cervical exam, FH, FHT, position  Membranes stripped: Yes    A/P   Doing well.  No concerns today, no major ctx or BH.  Cervix unchanged from last visit.  Cephalic position confirmed by ultrasound prior visit.   GBS NEGATIVE  5/15: Growth US appropriate interval growth and passed BPP .  Plan induction 39 weeks, will assess SVE at next visit and consider need for cervical ripening.  Normal anatomy ultrasound.  GTT passed.  Discussed kick counts and fetal movement.  Discussed PTL, PROM, and when to call or come in.  Reportable signs and symptoms discussed.  RTC 1 wk or sooner with problems.     Pregnancy Issues              1. Chronic hypertension in pregnancy - Nifedipine and atenolol, Level 2 normal.   MFM recs:  Rpt echo/EKG if s/sx.  Goal BP <140/<90, if BP >160/110 and nifedipine maxed, consider HCTZ if no preE.  LDA bid, patient taking qD  Growth scans q4w  Weekly BPP starting 32 weeks.  Delivery: Well-cont: 57p6m-50z7p, Poor cont: 62g3u-96h3i.   PreE w/o severe features by 36-37 weeks w/ severe features no later than 34 weeks.  2. Obesity     Prenatal care plan              - prior deliveries: no complcations, 3 prior deliveries at term.  - OB Labs of concern: none              - Rh- A POS              - First trimester screening:  NIPS nl               - Labor pain management:  absolutely!              - Ped:  Sarah Egan              - Birth control: IUD, paragard, had before without issues              - Breast feeding:  no, plans to restart losartan              - Covid 19 vaccine:  done              - influenza vaccine: done              - Tdap 4/24/24

## 2024-05-23 RX ORDER — ONDANSETRON 4 MG/1
4 TABLET, FILM COATED ORAL EVERY 8 HOURS PRN
Qty: 30 TABLET | Refills: 0 | Status: ON HOLD | OUTPATIENT
Start: 2024-05-23 | End: 2024-06-01

## 2024-05-23 RX ORDER — ONDANSETRON 4 MG/1
4 TABLET, FILM COATED ORAL EVERY 8 HOURS PRN
Qty: 30 TABLET | Refills: 0 | OUTPATIENT
Start: 2024-05-23

## 2024-05-29 ENCOUNTER — PRENATAL OFFICE VISIT (OUTPATIENT)
Dept: FAMILY MEDICINE | Facility: CLINIC | Age: 34
End: 2024-05-29
Payer: COMMERCIAL

## 2024-05-29 VITALS
BODY MASS INDEX: 36.99 KG/M2 | RESPIRATION RATE: 18 BRPM | HEART RATE: 98 BPM | SYSTOLIC BLOOD PRESSURE: 132 MMHG | DIASTOLIC BLOOD PRESSURE: 82 MMHG | OXYGEN SATURATION: 99 % | TEMPERATURE: 97.2 F | HEIGHT: 60 IN | WEIGHT: 188.44 LBS

## 2024-05-29 DIAGNOSIS — O10.919 CHRONIC HYPERTENSION IN PREGNANCY: ICD-10-CM

## 2024-05-29 DIAGNOSIS — F41.1 GAD (GENERALIZED ANXIETY DISORDER): ICD-10-CM

## 2024-05-29 DIAGNOSIS — O09.93 SUPERVISION OF HIGH-RISK PREGNANCY, THIRD TRIMESTER: Primary | ICD-10-CM

## 2024-05-29 DIAGNOSIS — O99.213 MATERNAL OBESITY SYNDROME IN THIRD TRIMESTER: ICD-10-CM

## 2024-05-29 PROCEDURE — 99207 PR COMPLICATED OB VISIT: CPT | Performed by: STUDENT IN AN ORGANIZED HEALTH CARE EDUCATION/TRAINING PROGRAM

## 2024-05-29 RX ORDER — OXYTOCIN/0.9 % SODIUM CHLORIDE 30/500 ML
340 PLASTIC BAG, INJECTION (ML) INTRAVENOUS CONTINUOUS PRN
Status: CANCELLED | OUTPATIENT
Start: 2024-05-29

## 2024-05-29 RX ORDER — CARBOPROST TROMETHAMINE 250 UG/ML
250 INJECTION, SOLUTION INTRAMUSCULAR
Status: CANCELLED | OUTPATIENT
Start: 2024-05-29

## 2024-05-29 RX ORDER — IBUPROFEN 200 MG
800 TABLET ORAL
Status: CANCELLED | OUTPATIENT
Start: 2024-05-29

## 2024-05-29 RX ORDER — CITRIC ACID/SODIUM CITRATE 334-500MG
30 SOLUTION, ORAL ORAL
Status: CANCELLED | OUTPATIENT
Start: 2024-05-29

## 2024-05-29 RX ORDER — NALOXONE HYDROCHLORIDE 0.4 MG/ML
0.4 INJECTION, SOLUTION INTRAMUSCULAR; INTRAVENOUS; SUBCUTANEOUS
Status: CANCELLED | OUTPATIENT
Start: 2024-05-29

## 2024-05-29 RX ORDER — OXYTOCIN 10 [USP'U]/ML
10 INJECTION, SOLUTION INTRAMUSCULAR; INTRAVENOUS
Status: CANCELLED | OUTPATIENT
Start: 2024-05-29

## 2024-05-29 RX ORDER — OXYTOCIN/0.9 % SODIUM CHLORIDE 30/500 ML
100-340 PLASTIC BAG, INJECTION (ML) INTRAVENOUS CONTINUOUS PRN
Status: CANCELLED | OUTPATIENT
Start: 2024-05-29

## 2024-05-29 RX ORDER — ONDANSETRON 2 MG/ML
4 INJECTION INTRAMUSCULAR; INTRAVENOUS EVERY 6 HOURS PRN
Status: CANCELLED | OUTPATIENT
Start: 2024-05-29

## 2024-05-29 RX ORDER — OXYTOCIN/0.9 % SODIUM CHLORIDE 30/500 ML
1-24 PLASTIC BAG, INJECTION (ML) INTRAVENOUS CONTINUOUS
Status: CANCELLED | OUTPATIENT
Start: 2024-05-29

## 2024-05-29 RX ORDER — KETOROLAC TROMETHAMINE 30 MG/ML
30 INJECTION, SOLUTION INTRAMUSCULAR; INTRAVENOUS
Status: CANCELLED | OUTPATIENT
Start: 2024-05-29

## 2024-05-29 RX ORDER — LOPERAMIDE HCL 2 MG
2 CAPSULE ORAL
Status: CANCELLED | OUTPATIENT
Start: 2024-05-29

## 2024-05-29 RX ORDER — METOCLOPRAMIDE 5 MG/1
10 TABLET ORAL EVERY 6 HOURS PRN
Status: CANCELLED | OUTPATIENT
Start: 2024-05-29

## 2024-05-29 RX ORDER — LIDOCAINE 40 MG/G
CREAM TOPICAL
Status: CANCELLED | OUTPATIENT
Start: 2024-05-29

## 2024-05-29 RX ORDER — ACETAMINOPHEN 325 MG/1
650 TABLET ORAL EVERY 4 HOURS PRN
Status: CANCELLED | OUTPATIENT
Start: 2024-05-29

## 2024-05-29 RX ORDER — METOCLOPRAMIDE HYDROCHLORIDE 5 MG/ML
10 INJECTION INTRAMUSCULAR; INTRAVENOUS EVERY 6 HOURS PRN
Status: CANCELLED | OUTPATIENT
Start: 2024-05-29

## 2024-05-29 RX ORDER — TRANEXAMIC ACID 10 MG/ML
1 INJECTION, SOLUTION INTRAVENOUS EVERY 30 MIN PRN
Status: CANCELLED | OUTPATIENT
Start: 2024-05-29

## 2024-05-29 RX ORDER — PROCHLORPERAZINE 25 MG
25 SUPPOSITORY, RECTAL RECTAL EVERY 12 HOURS PRN
Status: CANCELLED | OUTPATIENT
Start: 2024-05-29

## 2024-05-29 RX ORDER — NALOXONE HYDROCHLORIDE 0.4 MG/ML
0.2 INJECTION, SOLUTION INTRAMUSCULAR; INTRAVENOUS; SUBCUTANEOUS
Status: CANCELLED | OUTPATIENT
Start: 2024-05-29

## 2024-05-29 RX ORDER — SODIUM CHLORIDE, SODIUM LACTATE, POTASSIUM CHLORIDE, CALCIUM CHLORIDE 600; 310; 30; 20 MG/100ML; MG/100ML; MG/100ML; MG/100ML
INJECTION, SOLUTION INTRAVENOUS CONTINUOUS PRN
Status: CANCELLED | OUTPATIENT
Start: 2024-05-29

## 2024-05-29 RX ORDER — ONDANSETRON 4 MG/1
4 TABLET, ORALLY DISINTEGRATING ORAL EVERY 6 HOURS PRN
Status: CANCELLED | OUTPATIENT
Start: 2024-05-29

## 2024-05-29 RX ORDER — PROCHLORPERAZINE MALEATE 5 MG
10 TABLET ORAL EVERY 6 HOURS PRN
Status: CANCELLED | OUTPATIENT
Start: 2024-05-29

## 2024-05-29 RX ORDER — MISOPROSTOL 200 UG/1
400 TABLET ORAL
Status: CANCELLED | OUTPATIENT
Start: 2024-05-29

## 2024-05-29 RX ORDER — FENTANYL CITRATE 50 UG/ML
50 INJECTION, SOLUTION INTRAMUSCULAR; INTRAVENOUS EVERY 30 MIN PRN
Status: CANCELLED | OUTPATIENT
Start: 2024-05-29

## 2024-05-29 RX ORDER — LOPERAMIDE HCL 2 MG
4 CAPSULE ORAL
Status: CANCELLED | OUTPATIENT
Start: 2024-05-29

## 2024-05-29 ASSESSMENT — PAIN SCALES - GENERAL: PAINLEVEL: NO PAIN (0)

## 2024-05-29 NOTE — PROGRESS NOTES
Louise Branch 34 year old  @ 39w0d with an Estimated Date of Delivery: 2024, by Last Menstrual Period here for OB visit.  No bleeding or LOF.   No major BH or ctx activity.  No concerns.  BP's wnl at home.  Discussed induction today, desires ASAP.    Prenatal Labs:   Lab Results   Component Value Date    AS Negative 10/26/2023    HEPBANG Nonreactive 10/26/2023    CHPCRT Negative 2023    GCPCRT Negative 2023    HGB 12.6 10/26/2023        /82   Pulse 98   Temp 97.2  F (36.2  C) (Temporal)   Resp 18   Ht 1.524 m (5')   Wt 85.5 kg (188 lb 7 oz)   LMP 2023   SpO2 99%   BMI 36.80 kg/m     See flowsheet for SVE  Membranes stripped? No    Current Outpatient Medications   Medication Sig Dispense Refill    aspirin 81 MG EC tablet Take 1 tablet (81 mg) by mouth daily 30 tablet 4    atenolol (TENORMIN) 25 MG tablet Take 1 tablet (25 mg) by mouth daily 30 tablet 4    FLUoxetine (PROZAC) 40 MG capsule Take 1 capsule by mouth every morning      NIFEdipine ER OSMOTIC (PROCARDIA XL) 30 MG 24 hr tablet Take 1 tablet (30 mg) by mouth daily 30 tablet 4    ondansetron (ZOFRAN) 4 MG tablet TAKE 1 TABLET BY MOUTH EVERY 8 HOURS AS NEEDED FOR NAUSEA 30 tablet 0    Prenatal Vit-Fe Fumarate-FA (PRENATAL MULTIVITAMIN  PLUS IRON) 27-1 MG TABS Take by mouth daily       No current facility-administered medications for this visit.        A/P   Still no significant ctx or BH.  Signs of labor discussed.  GBS negative, but patient requests antibiotic prophylaxis due to prior infant with GBS infection (sepsis) 1 month post delivery.  Unable to proceed with membrane stripping due to high station.  Patient desiring IOL, reviewed the risks/benefits of IOL, patient opting to proceed.    Yoder 5, mIOLfor well controlled cHTN (with STEFFI and maternal obesity syndrome) scheduled for arrival tomorrow morning 7:30 AM with plans for pitocin.     Pregnancy Issues              1. Chronic hypertension in pregnancy - Has  been well controlled for BP's on Nifedipine and atenolol, Level 2 normal.   MFM recs:  Rpt echo/EKG if s/sx.  Goal BP <140/<90, if BP >160/110 and nifedipine maxed, consider HCTZ if no preE.  LDA bid, patient taking qD  Growth scans q4w  Weekly BPP starting 32 weeks.  Delivery: Well-cont: 74b8p-83g6p, Poor cont: 10i5a-24x6i.   PreE w/o severe features by 36-37 weeks w/ severe features no later than 34 weeks.  2. Obesity - passed GTT, growth intervals appropriate  3. STEFFI - Well controlled on Fluoxetine.  4. GBS  Sepsis history - Plan for GBS prophylaxis, on call provider aware and will order.      Prenatal care plan              - prior deliveries: no complcations, 3 prior deliveries at term.  - OB Labs of concern: none              - Rh- A POS              - First trimester screening:  NIPS nl              - Labor pain management:  absolutely!              - Ped:  Sarah Egan              - Birth control: IUD, paragard, had before without issues              - Breast feeding:  no, plans to restart losartan              - Covid 19 vaccine:  done              - influenza vaccine: done              - Tdap 24

## 2024-05-30 ENCOUNTER — HOSPITAL ENCOUNTER (INPATIENT)
Facility: CLINIC | Age: 34
LOS: 2 days | Discharge: HOME | End: 2024-06-01
Attending: FAMILY MEDICINE | Admitting: FAMILY MEDICINE
Payer: COMMERCIAL

## 2024-05-30 DIAGNOSIS — O10.919 CHRONIC HYPERTENSION IN PREGNANCY: ICD-10-CM

## 2024-05-30 PROBLEM — Z34.90 ENCOUNTER FOR ELECTIVE INDUCTION OF LABOR: Status: ACTIVE | Noted: 2024-05-30

## 2024-05-30 LAB
ABO/RH(D): NORMAL
ALBUMIN MFR UR ELPH: 11.9 MG/DL
ALBUMIN SERPL BCG-MCNC: 3.5 G/DL (ref 3.5–5.2)
ALP SERPL-CCNC: 168 U/L (ref 40–150)
ALT SERPL W P-5'-P-CCNC: 10 U/L (ref 0–50)
ANION GAP SERPL CALCULATED.3IONS-SCNC: 11 MMOL/L (ref 7–15)
ANTIBODY SCREEN: NEGATIVE
AST SERPL W P-5'-P-CCNC: 17 U/L (ref 0–45)
BASOPHILS # BLD AUTO: 0 10E3/UL (ref 0–0.2)
BASOPHILS NFR BLD AUTO: 0 %
BILIRUB SERPL-MCNC: 0.2 MG/DL
BUN SERPL-MCNC: 5.3 MG/DL (ref 6–20)
CALCIUM SERPL-MCNC: 9.2 MG/DL (ref 8.6–10)
CHLORIDE SERPL-SCNC: 104 MMOL/L (ref 98–107)
CREAT SERPL-MCNC: 0.6 MG/DL (ref 0.51–0.95)
CREAT UR-MCNC: 65 MG/DL
DEPRECATED HCO3 PLAS-SCNC: 20 MMOL/L (ref 22–29)
EGFRCR SERPLBLD CKD-EPI 2021: >90 ML/MIN/1.73M2
EOSINOPHIL # BLD AUTO: 0 10E3/UL (ref 0–0.7)
EOSINOPHIL NFR BLD AUTO: 0 %
ERYTHROCYTE [DISTWIDTH] IN BLOOD BY AUTOMATED COUNT: 13.2 % (ref 10–15)
GLUCOSE SERPL-MCNC: 90 MG/DL (ref 70–99)
HCT VFR BLD AUTO: 35.1 % (ref 35–47)
HGB BLD-MCNC: 11.9 G/DL (ref 11.7–15.7)
IMM GRANULOCYTES # BLD: 0 10E3/UL
IMM GRANULOCYTES NFR BLD: 0 %
LYMPHOCYTES # BLD AUTO: 2.1 10E3/UL (ref 0.8–5.3)
LYMPHOCYTES NFR BLD AUTO: 22 %
MCH RBC QN AUTO: 28.1 PG (ref 26.5–33)
MCHC RBC AUTO-ENTMCNC: 33.9 G/DL (ref 31.5–36.5)
MCV RBC AUTO: 83 FL (ref 78–100)
MONOCYTES # BLD AUTO: 1 10E3/UL (ref 0–1.3)
MONOCYTES NFR BLD AUTO: 10 %
NEUTROPHILS # BLD AUTO: 6.4 10E3/UL (ref 1.6–8.3)
NEUTROPHILS NFR BLD AUTO: 67 %
NRBC # BLD AUTO: 0 10E3/UL
NRBC BLD AUTO-RTO: 0 /100
PLATELET # BLD AUTO: 269 10E3/UL (ref 150–450)
POTASSIUM SERPL-SCNC: 4.2 MMOL/L (ref 3.4–5.3)
PROT SERPL-MCNC: 7.1 G/DL (ref 6.4–8.3)
PROT/CREAT 24H UR: 0.18 MG/MG CR (ref 0–0.2)
RBC # BLD AUTO: 4.23 10E6/UL (ref 3.8–5.2)
SODIUM SERPL-SCNC: 135 MMOL/L (ref 135–145)
SPECIMEN EXPIRATION DATE: NORMAL
WBC # BLD AUTO: 9.6 10E3/UL (ref 4–11)

## 2024-05-30 PROCEDURE — 86900 BLOOD TYPING SEROLOGIC ABO: CPT | Performed by: STUDENT IN AN ORGANIZED HEALTH CARE EDUCATION/TRAINING PROGRAM

## 2024-05-30 PROCEDURE — 80053 COMPREHEN METABOLIC PANEL: CPT | Performed by: FAMILY MEDICINE

## 2024-05-30 PROCEDURE — 250N000013 HC RX MED GY IP 250 OP 250 PS 637: Performed by: FAMILY MEDICINE

## 2024-05-30 PROCEDURE — 250N000009 HC RX 250: Performed by: STUDENT IN AN ORGANIZED HEALTH CARE EDUCATION/TRAINING PROGRAM

## 2024-05-30 PROCEDURE — 84156 ASSAY OF PROTEIN URINE: CPT | Performed by: FAMILY MEDICINE

## 2024-05-30 PROCEDURE — 120N000001 HC R&B MED SURG/OB

## 2024-05-30 PROCEDURE — 250N000011 HC RX IP 250 OP 636: Mod: JZ | Performed by: STUDENT IN AN ORGANIZED HEALTH CARE EDUCATION/TRAINING PROGRAM

## 2024-05-30 PROCEDURE — 258N000003 HC RX IP 258 OP 636: Performed by: STUDENT IN AN ORGANIZED HEALTH CARE EDUCATION/TRAINING PROGRAM

## 2024-05-30 PROCEDURE — 85025 COMPLETE CBC W/AUTO DIFF WBC: CPT | Performed by: FAMILY MEDICINE

## 2024-05-30 RX ORDER — PROCHLORPERAZINE MALEATE 5 MG
10 TABLET ORAL EVERY 6 HOURS PRN
Status: DISCONTINUED | OUTPATIENT
Start: 2024-05-30 | End: 2024-05-31 | Stop reason: HOSPADM

## 2024-05-30 RX ORDER — MISOPROSTOL 200 UG/1
400 TABLET ORAL
Status: DISCONTINUED | OUTPATIENT
Start: 2024-05-30 | End: 2024-05-31 | Stop reason: HOSPADM

## 2024-05-30 RX ORDER — METOCLOPRAMIDE HYDROCHLORIDE 5 MG/ML
10 INJECTION INTRAMUSCULAR; INTRAVENOUS EVERY 6 HOURS PRN
Status: DISCONTINUED | OUTPATIENT
Start: 2024-05-30 | End: 2024-05-31 | Stop reason: HOSPADM

## 2024-05-30 RX ORDER — PROCHLORPERAZINE 25 MG
25 SUPPOSITORY, RECTAL RECTAL EVERY 12 HOURS PRN
Status: DISCONTINUED | OUTPATIENT
Start: 2024-05-30 | End: 2024-05-31 | Stop reason: HOSPADM

## 2024-05-30 RX ORDER — METOCLOPRAMIDE 5 MG/1
10 TABLET ORAL EVERY 6 HOURS PRN
Status: DISCONTINUED | OUTPATIENT
Start: 2024-05-30 | End: 2024-05-31 | Stop reason: HOSPADM

## 2024-05-30 RX ORDER — OXYTOCIN/0.9 % SODIUM CHLORIDE 30/500 ML
1-24 PLASTIC BAG, INJECTION (ML) INTRAVENOUS CONTINUOUS
Status: DISCONTINUED | OUTPATIENT
Start: 2024-05-30 | End: 2024-05-31 | Stop reason: HOSPADM

## 2024-05-30 RX ORDER — LOPERAMIDE HCL 2 MG
2 CAPSULE ORAL
Status: DISCONTINUED | OUTPATIENT
Start: 2024-05-30 | End: 2024-05-31 | Stop reason: HOSPADM

## 2024-05-30 RX ORDER — NALOXONE HYDROCHLORIDE 0.4 MG/ML
0.2 INJECTION, SOLUTION INTRAMUSCULAR; INTRAVENOUS; SUBCUTANEOUS
Status: DISCONTINUED | OUTPATIENT
Start: 2024-05-30 | End: 2024-05-31 | Stop reason: HOSPADM

## 2024-05-30 RX ORDER — NALOXONE HYDROCHLORIDE 0.4 MG/ML
0.4 INJECTION, SOLUTION INTRAMUSCULAR; INTRAVENOUS; SUBCUTANEOUS
Status: DISCONTINUED | OUTPATIENT
Start: 2024-05-30 | End: 2024-05-31 | Stop reason: HOSPADM

## 2024-05-30 RX ORDER — ONDANSETRON 4 MG/1
4 TABLET, ORALLY DISINTEGRATING ORAL EVERY 6 HOURS PRN
Status: DISCONTINUED | OUTPATIENT
Start: 2024-05-30 | End: 2024-05-31 | Stop reason: HOSPADM

## 2024-05-30 RX ORDER — ACETAMINOPHEN 325 MG/1
650 TABLET ORAL EVERY 4 HOURS PRN
Status: DISCONTINUED | OUTPATIENT
Start: 2024-05-30 | End: 2024-05-31 | Stop reason: HOSPADM

## 2024-05-30 RX ORDER — FENTANYL CITRATE 50 UG/ML
50 INJECTION, SOLUTION INTRAMUSCULAR; INTRAVENOUS EVERY 30 MIN PRN
Status: DISCONTINUED | OUTPATIENT
Start: 2024-05-30 | End: 2024-05-31 | Stop reason: HOSPADM

## 2024-05-30 RX ORDER — CITRIC ACID/SODIUM CITRATE 334-500MG
30 SOLUTION, ORAL ORAL
Status: DISCONTINUED | OUTPATIENT
Start: 2024-05-30 | End: 2024-05-31 | Stop reason: HOSPADM

## 2024-05-30 RX ORDER — LIDOCAINE 40 MG/G
CREAM TOPICAL
Status: DISCONTINUED | OUTPATIENT
Start: 2024-05-30 | End: 2024-05-31 | Stop reason: HOSPADM

## 2024-05-30 RX ORDER — MISOPROSTOL 100 UG/1
25 TABLET ORAL EVERY 4 HOURS PRN
Status: DISCONTINUED | OUTPATIENT
Start: 2024-05-30 | End: 2024-05-31 | Stop reason: HOSPADM

## 2024-05-30 RX ORDER — NIFEDIPINE 30 MG/1
30 TABLET, EXTENDED RELEASE ORAL DAILY
Status: DISCONTINUED | OUTPATIENT
Start: 2024-05-31 | End: 2024-05-31

## 2024-05-30 RX ORDER — LOPERAMIDE HCL 2 MG
4 CAPSULE ORAL
Status: DISCONTINUED | OUTPATIENT
Start: 2024-05-30 | End: 2024-05-31 | Stop reason: HOSPADM

## 2024-05-30 RX ORDER — ONDANSETRON 2 MG/ML
4 INJECTION INTRAMUSCULAR; INTRAVENOUS EVERY 6 HOURS PRN
Status: DISCONTINUED | OUTPATIENT
Start: 2024-05-30 | End: 2024-05-31 | Stop reason: HOSPADM

## 2024-05-30 RX ORDER — OXYTOCIN 10 [USP'U]/ML
10 INJECTION, SOLUTION INTRAMUSCULAR; INTRAVENOUS
Status: DISCONTINUED | OUTPATIENT
Start: 2024-05-30 | End: 2024-05-31 | Stop reason: HOSPADM

## 2024-05-30 RX ORDER — TRANEXAMIC ACID 10 MG/ML
1 INJECTION, SOLUTION INTRAVENOUS EVERY 30 MIN PRN
Status: DISCONTINUED | OUTPATIENT
Start: 2024-05-30 | End: 2024-05-31 | Stop reason: HOSPADM

## 2024-05-30 RX ORDER — CARBOPROST TROMETHAMINE 250 UG/ML
250 INJECTION, SOLUTION INTRAMUSCULAR
Status: DISCONTINUED | OUTPATIENT
Start: 2024-05-30 | End: 2024-05-31 | Stop reason: HOSPADM

## 2024-05-30 RX ORDER — OXYTOCIN/0.9 % SODIUM CHLORIDE 30/500 ML
340 PLASTIC BAG, INJECTION (ML) INTRAVENOUS CONTINUOUS PRN
Status: DISCONTINUED | OUTPATIENT
Start: 2024-05-30 | End: 2024-05-31 | Stop reason: HOSPADM

## 2024-05-30 RX ORDER — ATENOLOL 25 MG/1
25 TABLET ORAL DAILY
Status: DISCONTINUED | OUTPATIENT
Start: 2024-05-31 | End: 2024-05-31

## 2024-05-30 RX ORDER — SODIUM CHLORIDE, SODIUM LACTATE, POTASSIUM CHLORIDE, CALCIUM CHLORIDE 600; 310; 30; 20 MG/100ML; MG/100ML; MG/100ML; MG/100ML
INJECTION, SOLUTION INTRAVENOUS CONTINUOUS PRN
Status: DISCONTINUED | OUTPATIENT
Start: 2024-05-30 | End: 2024-05-31 | Stop reason: HOSPADM

## 2024-05-30 RX ADMIN — MISOPROSTOL 25 MCG: 100 TABLET ORAL at 23:10

## 2024-05-30 RX ADMIN — SODIUM CHLORIDE, POTASSIUM CHLORIDE, SODIUM LACTATE AND CALCIUM CHLORIDE: 600; 310; 30; 20 INJECTION, SOLUTION INTRAVENOUS at 10:21

## 2024-05-30 RX ADMIN — METOCLOPRAMIDE 10 MG: 5 INJECTION, SOLUTION INTRAMUSCULAR; INTRAVENOUS at 13:32

## 2024-05-30 RX ADMIN — Medication 2 MILLI-UNITS/MIN: at 10:21

## 2024-05-30 ASSESSMENT — ACTIVITIES OF DAILY LIVING (ADL)
ADLS_ACUITY_SCORE: 20

## 2024-05-30 NOTE — PROGRESS NOTES
Dr. Benson at bedside for cervical exam. 3cm/60%/-2.  Unable to AROM at this time. Patient at 10mu/min for pitocin. Regular contraction pattern, q2m apart. Pt reports they are close together; pitocin turned back down to 8mu/min.

## 2024-05-30 NOTE — PROGRESS NOTES
Dr. Benson was at bedside to assess patient and confirm fetal position. Cephalic position confirmed. OK to proceed with pitocin plan. RN reviewed pitocin protocol with patient who is comfortable with proceeding. Pitocin started at 1021.

## 2024-05-30 NOTE — PROGRESS NOTES
Pitocin at 10mu/min. Cat I strip. Irregular contraction pattern, pt states she is comfortable but cramps are noticeable. Voiding frequently. Plan to ambulate hallways for a bit before cervical exam and continuing to increase pitocin.

## 2024-05-30 NOTE — H&P
"[facility] Labor and Delivery History and Physical    Louise Branch MRN# [id number]   Age: 34 year old YOB: 1990     Date of Admission:  2024    Primary care provider: No Ref-Primary, Physician           Chief Complaint:   Louise Branch is a 34 year old female who is 39w1d pregnant and being admitted for induction, cHTN, controlled on meds. Yoder 5. Feels well. Denies Vb, LOF, +FM. Prior baby with GBS sepsis. Plan for antibiotics. No questions. Here with jarek.           Pregnancy history:     OBSTETRIC HISTORY:    OB History    Para Term  AB Living   5 3 1 1 1 3   SAB IAB Ectopic Multiple Live Births   0 0 0 0 3      # Outcome Date GA Lbr Carlos/2nd Weight Sex Type Anes PTL Lv   5 Current            4 AB 2023 5w0d          3  18 37w5d 03:07 / 00:23 2.89 kg (6 lb 5.9 oz) F Vag-Spont EPI  ALLISON      Name: Danna      Apgar1: 6  Apgar5: 7   2 Para 14 40w0d  3.175 kg (7 lb) M Vag-Spont   ALLISON      Complications: GBS (group B streptococcus) infection, Postpartum hypertension      Name: Jamin   1 Term 10/24/09 37w2d  3.175 kg (7 lb) F Vag-Spont   ALLISON      Name: Savannah      Obstetric Comments   EDC 2024 based on LMP.   to [place].  This will be their first delivery in [place].         EDC: Estimated Date of Delivery: 2024    Prenatal Labs:   Lab Results   Component Value Date    AS Negative 10/26/2023    HEPBANG Nonreactive 10/26/2023    CHPCRT Negative 2023    GCPCRT Negative 2023    HGB 12.6 10/26/2023       GBS Status:   No results found for: \"GBS\"    Active Problem List  Patient Active Problem List   Diagnosis    STEFFI (generalized anxiety disorder)    Benign essential hypertension    Palpitations    Encounter for triage in pregnant patient       Medication Prior to Admission  Medications Prior to Admission   Medication Sig Dispense Refill Last Dose    aspirin 81 MG EC tablet Take 1 tablet (81 mg) by mouth daily 30 " tablet 4 Past Week    atenolol (TENORMIN) 25 MG tablet Take 1 tablet (25 mg) by mouth daily 30 tablet 4 2024    FLUoxetine (PROZAC) 40 MG capsule Take 1 capsule by mouth every morning   2024    NIFEdipine ER OSMOTIC (PROCARDIA XL) 30 MG 24 hr tablet Take 1 tablet (30 mg) by mouth daily 30 tablet 4 2024    ondansetron (ZOFRAN) 4 MG tablet TAKE 1 TABLET BY MOUTH EVERY 8 HOURS AS NEEDED FOR NAUSEA 30 tablet 0 2024    Prenatal Vit-Fe Fumarate-FA (PRENATAL MULTIVITAMIN  PLUS IRON) 27-1 MG TABS Take by mouth daily   2024   .        Maternal Past Medical History:     Past Medical History:   Diagnosis Date    Anxiety     Heart palpitations     History of postpartum hypertension                        Family History:   I have reviewed this patient's family history and commented on sigificant items within the HPI            Social History:   I have reviewed this patient's social history and commented on significant items within the HPI         Review of Systems:   The Review of Systems is negative other than noted in the HPI          Physical Exam:   Temp: 98.4  F (36.9  C) Temp src: Oral BP: 138/87     Resp: 16 SpO2: 98 % O2 Device: None (Room air)    Patient Vitals for the past 8 hrs:   BP Temp Temp src Resp SpO2   24 0820 138/87 98.4  F (36.9  C) Oral 16 98 %       gen - well appearing  CV - RRR  Lungs: CTAB  Abd- gravid, non tender    Extremities:   Warm, well perfused  Edema absent     See RN flowsheet  Presentation:Cephalic  Fetal Heart Rate Tracing: Tier 1 (normal)  Tocometer: external monitor     EFW:  lb                 Assessment:   Louise Branch is a 39w1d pregnant female admitted with induction.          Plan:   Admit - see IP orders  Labor induction with Pitocin  Anticipate       Jason Benson MD

## 2024-05-30 NOTE — PROGRESS NOTES
Pitocin currently at 8mu/min. Patient BP at 1240 was 140/90. Dr. Benson updated, VORB to check q1h for 4hrs. Patient remains asymptomatic.at this time.

## 2024-05-30 NOTE — PROGRESS NOTES
Patient declines cervical check at this time. Courtney q2m apart. Reports she is noticing contractions more often now. Will give patient some more time before reassessing the situation.

## 2024-05-30 NOTE — PROGRESS NOTES
Data: Patient presented to [facility] on 2024 @ 0755.  Patient admitted for induction for cHTN. Patient is a .  Prenatal record reviewed. Pregnancy has been complicated by chronic hypertension, well controlled per provider.  Gestational Age 39w1d. VSS. Fetal movement present. Patient reports no uterine contractions, denies leaking of vaginal fluid/rupture of membranes, vaginal bleeding, abdominal pain, epigastric or URQ pain. Support person Bal Branch is present.   Action: Verbal consent for EFM.  Admission assessment completed. Bill of rights reviewed.  Response: Patient verbalized agreement with plan. Will contact Dr Benson with update and further orders.

## 2024-05-31 ENCOUNTER — ANESTHESIA EVENT (OUTPATIENT)
Dept: OBGYN | Facility: CLINIC | Age: 34
End: 2024-05-31
Payer: COMMERCIAL

## 2024-05-31 ENCOUNTER — ANESTHESIA (OUTPATIENT)
Dept: OBGYN | Facility: CLINIC | Age: 34
End: 2024-05-31
Payer: COMMERCIAL

## 2024-05-31 PROBLEM — O10.919 CHRONIC HYPERTENSION IN PREGNANCY: Status: ACTIVE | Noted: 2024-05-31

## 2024-05-31 PROCEDURE — 00HU33Z INSERTION OF INFUSION DEVICE INTO SPINAL CANAL, PERCUTANEOUS APPROACH: ICD-10-PCS | Performed by: OBSTETRICS & GYNECOLOGY

## 2024-05-31 PROCEDURE — 59400 OBSTETRICAL CARE: CPT | Performed by: OBSTETRICS & GYNECOLOGY

## 2024-05-31 PROCEDURE — 250N000011 HC RX IP 250 OP 636: Performed by: NURSE ANESTHETIST, CERTIFIED REGISTERED

## 2024-05-31 PROCEDURE — 250N000011 HC RX IP 250 OP 636: Mod: JZ | Performed by: FAMILY MEDICINE

## 2024-05-31 PROCEDURE — 250N000013 HC RX MED GY IP 250 OP 250 PS 637: Performed by: FAMILY MEDICINE

## 2024-05-31 PROCEDURE — 250N000009 HC RX 250: Performed by: STUDENT IN AN ORGANIZED HEALTH CARE EDUCATION/TRAINING PROGRAM

## 2024-05-31 PROCEDURE — 258N000003 HC RX IP 258 OP 636: Performed by: NURSE ANESTHETIST, CERTIFIED REGISTERED

## 2024-05-31 PROCEDURE — 3E0R3BZ INTRODUCTION OF ANESTHETIC AGENT INTO SPINAL CANAL, PERCUTANEOUS APPROACH: ICD-10-PCS | Performed by: OBSTETRICS & GYNECOLOGY

## 2024-05-31 PROCEDURE — 258N000003 HC RX IP 258 OP 636: Performed by: FAMILY MEDICINE

## 2024-05-31 PROCEDURE — 10907ZC DRAINAGE OF AMNIOTIC FLUID, THERAPEUTIC FROM PRODUCTS OF CONCEPTION, VIA NATURAL OR ARTIFICIAL OPENING: ICD-10-PCS | Performed by: FAMILY MEDICINE

## 2024-05-31 PROCEDURE — 250N000011 HC RX IP 250 OP 636: Mod: JZ | Performed by: STUDENT IN AN ORGANIZED HEALTH CARE EDUCATION/TRAINING PROGRAM

## 2024-05-31 PROCEDURE — 59414 DELIVER PLACENTA: CPT | Performed by: FAMILY MEDICINE

## 2024-05-31 PROCEDURE — 370N000003 HC ANESTHESIA WARD SERVICE: Performed by: NURSE ANESTHETIST, CERTIFIED REGISTERED

## 2024-05-31 PROCEDURE — 3E033VJ INTRODUCTION OF OTHER HORMONE INTO PERIPHERAL VEIN, PERCUTANEOUS APPROACH: ICD-10-PCS | Performed by: FAMILY MEDICINE

## 2024-05-31 PROCEDURE — 120N000001 HC R&B MED SURG/OB

## 2024-05-31 PROCEDURE — 250N000009 HC RX 250: Performed by: NURSE ANESTHETIST, CERTIFIED REGISTERED

## 2024-05-31 PROCEDURE — 722N000001 HC LABOR CARE VAGINAL DELIVERY SINGLE

## 2024-05-31 RX ORDER — LOPERAMIDE HCL 2 MG
2 CAPSULE ORAL
Status: DISCONTINUED | OUTPATIENT
Start: 2024-05-31 | End: 2024-06-01 | Stop reason: HOSPADM

## 2024-05-31 RX ORDER — OXYTOCIN 10 [USP'U]/ML
10 INJECTION, SOLUTION INTRAMUSCULAR; INTRAVENOUS
Status: DISCONTINUED | OUTPATIENT
Start: 2024-05-31 | End: 2024-06-01 | Stop reason: HOSPADM

## 2024-05-31 RX ORDER — DOCUSATE SODIUM 100 MG/1
100 CAPSULE, LIQUID FILLED ORAL DAILY
Status: DISCONTINUED | OUTPATIENT
Start: 2024-05-31 | End: 2024-06-01 | Stop reason: HOSPADM

## 2024-05-31 RX ORDER — PENICILLIN G 3000000 [IU]/50ML
3 INJECTION, SOLUTION INTRAVENOUS EVERY 4 HOURS
Status: DISCONTINUED | OUTPATIENT
Start: 2024-05-31 | End: 2024-05-31 | Stop reason: HOSPADM

## 2024-05-31 RX ORDER — NALOXONE HYDROCHLORIDE 0.4 MG/ML
0.4 INJECTION, SOLUTION INTRAMUSCULAR; INTRAVENOUS; SUBCUTANEOUS
Status: DISCONTINUED | OUTPATIENT
Start: 2024-05-31 | End: 2024-06-01 | Stop reason: HOSPADM

## 2024-05-31 RX ORDER — NALOXONE HYDROCHLORIDE 0.4 MG/ML
0.4 INJECTION, SOLUTION INTRAMUSCULAR; INTRAVENOUS; SUBCUTANEOUS
Status: DISCONTINUED | OUTPATIENT
Start: 2024-05-31 | End: 2024-05-31

## 2024-05-31 RX ORDER — FENTANYL CITRATE 50 UG/ML
100 INJECTION, SOLUTION INTRAMUSCULAR; INTRAVENOUS
Status: DISCONTINUED | OUTPATIENT
Start: 2024-05-31 | End: 2024-05-31 | Stop reason: HOSPADM

## 2024-05-31 RX ORDER — IBUPROFEN 800 MG/1
800 TABLET, FILM COATED ORAL
Status: DISCONTINUED | OUTPATIENT
Start: 2024-05-31 | End: 2024-05-31

## 2024-05-31 RX ORDER — OXYTOCIN/0.9 % SODIUM CHLORIDE 30/500 ML
340 PLASTIC BAG, INJECTION (ML) INTRAVENOUS CONTINUOUS PRN
Status: DISCONTINUED | OUTPATIENT
Start: 2024-05-31 | End: 2024-06-01 | Stop reason: HOSPADM

## 2024-05-31 RX ORDER — NALOXONE HYDROCHLORIDE 0.4 MG/ML
0.2 INJECTION, SOLUTION INTRAMUSCULAR; INTRAVENOUS; SUBCUTANEOUS
Status: DISCONTINUED | OUTPATIENT
Start: 2024-05-31 | End: 2024-06-01 | Stop reason: HOSPADM

## 2024-05-31 RX ORDER — FENTANYL CITRATE 50 UG/ML
50 INJECTION, SOLUTION INTRAMUSCULAR; INTRAVENOUS
Status: DISCONTINUED | OUTPATIENT
Start: 2024-05-31 | End: 2024-05-31 | Stop reason: HOSPADM

## 2024-05-31 RX ORDER — BUPIVACAINE HYDROCHLORIDE 2.5 MG/ML
0.25 INJECTION, SOLUTION EPIDURAL; INFILTRATION; INTRACAUDAL ONCE
Status: DISCONTINUED | OUTPATIENT
Start: 2024-05-31 | End: 2024-05-31 | Stop reason: HOSPADM

## 2024-05-31 RX ORDER — MODIFIED LANOLIN
OINTMENT (GRAM) TOPICAL
Status: DISCONTINUED | OUTPATIENT
Start: 2024-05-31 | End: 2024-06-01 | Stop reason: HOSPADM

## 2024-05-31 RX ORDER — OXYTOCIN/0.9 % SODIUM CHLORIDE 30/500 ML
100-340 PLASTIC BAG, INJECTION (ML) INTRAVENOUS CONTINUOUS PRN
Status: DISCONTINUED | OUTPATIENT
Start: 2024-05-31 | End: 2024-06-01 | Stop reason: HOSPADM

## 2024-05-31 RX ORDER — NALOXONE HYDROCHLORIDE 0.4 MG/ML
0.2 INJECTION, SOLUTION INTRAMUSCULAR; INTRAVENOUS; SUBCUTANEOUS
Status: DISCONTINUED | OUTPATIENT
Start: 2024-05-31 | End: 2024-05-31

## 2024-05-31 RX ORDER — LOPERAMIDE HCL 2 MG
4 CAPSULE ORAL
Status: DISCONTINUED | OUTPATIENT
Start: 2024-05-31 | End: 2024-06-01 | Stop reason: HOSPADM

## 2024-05-31 RX ORDER — TRANEXAMIC ACID 10 MG/ML
1 INJECTION, SOLUTION INTRAVENOUS EVERY 30 MIN PRN
Status: DISCONTINUED | OUTPATIENT
Start: 2024-05-31 | End: 2024-06-01 | Stop reason: HOSPADM

## 2024-05-31 RX ORDER — FENTANYL CITRATE-0.9 % NACL/PF 10 MCG/ML
100 PLASTIC BAG, INJECTION (ML) INTRAVENOUS EVERY 5 MIN PRN
Status: DISCONTINUED | OUTPATIENT
Start: 2024-05-31 | End: 2024-05-31 | Stop reason: HOSPADM

## 2024-05-31 RX ORDER — COSYNTROPIN 0.25 MG/ML
1000 INJECTION, POWDER, FOR SOLUTION INTRAMUSCULAR; INTRAVENOUS ONCE
Status: DISCONTINUED | OUTPATIENT
Start: 2024-05-31 | End: 2024-05-31

## 2024-05-31 RX ORDER — HYDROCORTISONE 25 MG/G
CREAM TOPICAL 3 TIMES DAILY PRN
Status: DISCONTINUED | OUTPATIENT
Start: 2024-05-31 | End: 2024-06-01 | Stop reason: HOSPADM

## 2024-05-31 RX ORDER — LIDOCAINE HYDROCHLORIDE 10 MG/ML
INJECTION, SOLUTION EPIDURAL; INFILTRATION; INTRACAUDAL; PERINEURAL PRN
Status: DISCONTINUED | OUTPATIENT
Start: 2024-05-31 | End: 2024-05-31

## 2024-05-31 RX ORDER — NIFEDIPINE 30 MG/1
30 TABLET, EXTENDED RELEASE ORAL DAILY
Status: DISCONTINUED | OUTPATIENT
Start: 2024-06-01 | End: 2024-06-01 | Stop reason: HOSPADM

## 2024-05-31 RX ORDER — CARBOPROST TROMETHAMINE 250 UG/ML
250 INJECTION, SOLUTION INTRAMUSCULAR
Status: DISCONTINUED | OUTPATIENT
Start: 2024-05-31 | End: 2024-06-01 | Stop reason: HOSPADM

## 2024-05-31 RX ORDER — FENTANYL CITRATE-0.9 % NACL/PF 10 MCG/ML
100 PLASTIC BAG, INJECTION (ML) INTRAVENOUS EVERY 5 MIN PRN
Status: DISCONTINUED | OUTPATIENT
Start: 2024-05-31 | End: 2024-05-31

## 2024-05-31 RX ORDER — KETOROLAC TROMETHAMINE 30 MG/ML
30 INJECTION, SOLUTION INTRAMUSCULAR; INTRAVENOUS
Status: DISCONTINUED | OUTPATIENT
Start: 2024-05-31 | End: 2024-05-31

## 2024-05-31 RX ORDER — MISOPROSTOL 200 UG/1
400 TABLET ORAL
Status: DISCONTINUED | OUTPATIENT
Start: 2024-05-31 | End: 2024-06-01 | Stop reason: HOSPADM

## 2024-05-31 RX ORDER — OXYCODONE HYDROCHLORIDE 5 MG/1
5 TABLET ORAL EVERY 4 HOURS PRN
Status: DISCONTINUED | OUTPATIENT
Start: 2024-05-31 | End: 2024-06-01 | Stop reason: HOSPADM

## 2024-05-31 RX ORDER — BISACODYL 10 MG
10 SUPPOSITORY, RECTAL RECTAL DAILY PRN
Status: DISCONTINUED | OUTPATIENT
Start: 2024-05-31 | End: 2024-06-01 | Stop reason: HOSPADM

## 2024-05-31 RX ORDER — PENICILLIN G POTASSIUM 5000000 [IU]/1
5 INJECTION, POWDER, FOR SOLUTION INTRAMUSCULAR; INTRAVENOUS ONCE
Status: COMPLETED | OUTPATIENT
Start: 2024-05-31 | End: 2024-05-31

## 2024-05-31 RX ORDER — BUTALBITAL, ACETAMINOPHEN AND CAFFEINE 50; 325; 40 MG/1; MG/1; MG/1
1 TABLET ORAL EVERY 4 HOURS PRN
Status: DISCONTINUED | OUTPATIENT
Start: 2024-05-31 | End: 2024-06-01 | Stop reason: HOSPADM

## 2024-05-31 RX ORDER — METHYLERGONOVINE MALEATE 0.2 MG/ML
200 INJECTION INTRAVENOUS
Status: DISCONTINUED | OUTPATIENT
Start: 2024-05-31 | End: 2024-06-01 | Stop reason: HOSPADM

## 2024-05-31 RX ORDER — ACETAMINOPHEN 325 MG/1
650 TABLET ORAL EVERY 4 HOURS PRN
Status: DISCONTINUED | OUTPATIENT
Start: 2024-05-31 | End: 2024-06-01 | Stop reason: HOSPADM

## 2024-05-31 RX ORDER — NALBUPHINE HYDROCHLORIDE 10 MG/ML
2.5-5 INJECTION, SOLUTION INTRAMUSCULAR; INTRAVENOUS; SUBCUTANEOUS EVERY 6 HOURS PRN
Status: DISCONTINUED | OUTPATIENT
Start: 2024-05-31 | End: 2024-05-31

## 2024-05-31 RX ORDER — ATENOLOL 25 MG/1
25 TABLET ORAL DAILY
Status: DISCONTINUED | OUTPATIENT
Start: 2024-06-01 | End: 2024-06-01 | Stop reason: HOSPADM

## 2024-05-31 RX ORDER — IBUPROFEN 800 MG/1
800 TABLET, FILM COATED ORAL EVERY 6 HOURS PRN
Status: DISCONTINUED | OUTPATIENT
Start: 2024-05-31 | End: 2024-06-01 | Stop reason: HOSPADM

## 2024-05-31 RX ORDER — LIDOCAINE HYDROCHLORIDE AND EPINEPHRINE 15; 5 MG/ML; UG/ML
INJECTION, SOLUTION EPIDURAL PRN
Status: DISCONTINUED | OUTPATIENT
Start: 2024-05-31 | End: 2024-05-31

## 2024-05-31 RX ORDER — CITRIC ACID/SODIUM CITRATE 334-500MG
30 SOLUTION, ORAL ORAL
Status: DISCONTINUED | OUTPATIENT
Start: 2024-05-31 | End: 2024-05-31

## 2024-05-31 RX ORDER — PRENATAL VIT/IRON FUM/FOLIC AC 27MG-0.8MG
1 TABLET ORAL DAILY
Status: DISCONTINUED | OUTPATIENT
Start: 2024-05-31 | End: 2024-06-01 | Stop reason: HOSPADM

## 2024-05-31 RX ADMIN — COSYNTROPIN: 0.25 INJECTION, POWDER, LYOPHILIZED, FOR SOLUTION INTRAMUSCULAR; INTRAVENOUS at 15:22

## 2024-05-31 RX ADMIN — Medication 2 MILLI-UNITS/MIN: at 12:50

## 2024-05-31 RX ADMIN — PENICILLIN G 3 MILLION UNITS: 3000000 INJECTION, SOLUTION INTRAVENOUS at 12:12

## 2024-05-31 RX ADMIN — LIDOCAINE HYDROCHLORIDE,EPINEPHRINE BITARTRATE 1 ML: 15; .005 INJECTION, SOLUTION EPIDURAL; INFILTRATION; INTRACAUDAL; PERINEURAL at 11:29

## 2024-05-31 RX ADMIN — SODIUM CHLORIDE, POTASSIUM CHLORIDE, SODIUM LACTATE AND CALCIUM CHLORIDE 500 ML: 600; 310; 30; 20 INJECTION, SOLUTION INTRAVENOUS at 13:31

## 2024-05-31 RX ADMIN — Medication: at 11:55

## 2024-05-31 RX ADMIN — MISOPROSTOL 25 MCG: 100 TABLET ORAL at 05:07

## 2024-05-31 RX ADMIN — METOCLOPRAMIDE 10 MG: 5 INJECTION, SOLUTION INTRAMUSCULAR; INTRAVENOUS at 02:53

## 2024-05-31 RX ADMIN — FLUOXETINE 40 MG: 20 CAPSULE ORAL at 08:49

## 2024-05-31 RX ADMIN — METOCLOPRAMIDE 10 MG: 5 INJECTION, SOLUTION INTRAMUSCULAR; INTRAVENOUS at 13:46

## 2024-05-31 RX ADMIN — LIDOCAINE HYDROCHLORIDE 5 ML: 10 INJECTION, SOLUTION EPIDURAL; INFILTRATION; INTRACAUDAL; PERINEURAL at 11:10

## 2024-05-31 RX ADMIN — ONDANSETRON 4 MG: 2 INJECTION INTRAMUSCULAR; INTRAVENOUS at 07:59

## 2024-05-31 RX ADMIN — ACETAMINOPHEN 650 MG: 325 TABLET, FILM COATED ORAL at 17:09

## 2024-05-31 RX ADMIN — NIFEDIPINE 30 MG: 30 TABLET, EXTENDED RELEASE ORAL at 08:49

## 2024-05-31 RX ADMIN — ATENOLOL 25 MG: 25 TABLET ORAL at 08:49

## 2024-05-31 RX ADMIN — PENICILLIN G POTASSIUM 5 MILLION UNITS: 5000000 POWDER, FOR SOLUTION INTRAMUSCULAR; INTRAPLEURAL; INTRATHECAL; INTRAVENOUS at 08:18

## 2024-05-31 ASSESSMENT — ACTIVITIES OF DAILY LIVING (ADL)
ADLS_ACUITY_SCORE: 20

## 2024-05-31 NOTE — PLAN OF CARE
Goal Outcome Evaluation:    Vitals stable, WNL. Mild to moderate contraction pain rated 4/10 through the night, able to talk through and cope with contractions. Ambulating to bathroom and voiding independently. Denies visual changes, headache that wont resolve, RUQ pain. Contractions not picked up on monitor due to position, continuously readjusted. , 40-70 sec, moderate variability, accels present, no decells. 2nd dose of Cytotec given at 0500. 3 children slept in patients room with her this shift

## 2024-05-31 NOTE — PROGRESS NOTES
Two consecutive late variable decels at 1322 down to the 70's with no return to baseline after the first decel. Dr. De La Rosa was called to come & evaluate the pt. IV fluid bolus running, pt &  was updated on MD coming to evaluate.

## 2024-05-31 NOTE — PROGRESS NOTES
Pt in bed resting, states contractions are getting stronger rated 5/10 for pain, does not request pain interventions at this time. Has nausea and requests medication, PRN reglan administered at 0253. Last charted FHT's 120, moderate variability, accels present, no decels. CTX every 5-9 minutes, lasting 60-80 seconds. Alyssa Holder RN on 5/31/2024 at 3:06 AM

## 2024-05-31 NOTE — PROGRESS NOTES
Pitocin remains at 8mu/min. Pt chinyere q2-3, reports ctx are tolerable. Unable to increase pitocin at this time. BP systolic has been in the 140s; Dr. Benson aware. No new orders at this time. Report given to Alyssa COMER RN and Soni LABOY RN.

## 2024-05-31 NOTE — PROGRESS NOTES
Dr. De La Rosa was called to update on early variable decels noted since 1308 with every ctxs, PItocin was stopped at 1318. MD gave an order to give a 500 ml bolus of LR now & call MD back to come in & evaluate if not improving with interventions. Pt &  were updated on plan of care.

## 2024-05-31 NOTE — PROGRESS NOTES
SUBJECTIVE:   Patient is chinyere q 4 minutes, lasting 20-30 seconds and are very mild in quality.  She is still quite comfortable and has an had nothing for pain management.  The FHR is 130-140s with moderate variability and  multiple accelerations and no decelerations.  The overall strip is very reassuring.      Objective  /89 (BP Location: Left arm, Patient Position: Semi-Lr's, Cuff Size: Adult Regular)   Pulse 93   Temp 98.9  F (37.2  C) (Oral)   Resp 16   LMP 2023   SpO2 98%   Breastfeeding No   Exam:  SVE: shows the cervix to be 3 cm/50-60%/-3 station  AROM was accomplished using the amnihook with a small amount of clear fluid noted.  I did not place any internal at this time.     Ext: no edema noted    ASSESSMENT:  Term IUP at 39 2/7 wks gestation  Chronic HTN in pregnancy without evidence of pre-eclampsia.    latent labor    PLAN:  Will start her pitocin again and give GBS prophylaxis due to a previous  with GBS sepsis.      Electronically signed by:  Andrew De La Rosa M.D.  2024

## 2024-05-31 NOTE — PROGRESS NOTES
Dr. De La Rosa on the unit, updated on patient status. Antibiotics started shortly after 0800. Dr. De La Rosa reviewed the strip. Plan to monitor patient contractions and start pitocin at 1230. Patent updated on the plan of care.

## 2024-05-31 NOTE — L&D DELIVERY NOTE
Delivery addendum:  had been called to come in from [place] where I was working to assess the patient.  She had a few variable decels to the 90s when she was started on pitocin.  The pitocin was discontinued and then she started having some that looked late in timing.  Dr. Ledesma was in town so I had him go in to evaluate the patient until I got there since I was 15-20 minutes out.  She was very uncomfortable and was writhing is pain. Nursing had just checked her and she had been 4-5 cm, but Dr. Ledesma rechecked her at that time and she was complete. She could not wait to push so he did the delivery just before I got into the room. TOB was 1354  I then attended the patient and delivered the placenta in the Pimentel presentation with minimal fundal massage and traction. Time was 1358.  Perineum and genital tract was inspected and without any tears or trauma.      Both mom and this female  who weighed 2950 gm or 6# 8 oz with apgars of 6 and 8 at 1 and 5 minutes respectively were stable when I left the delivery room.    Electronically signed by:  Andrew De La Rosa M.D.  2024

## 2024-05-31 NOTE — PLAN OF CARE
Goal Outcome Evaluation:  Vitals stable, WNL. Mild contraction pain rated 4/10, able to talk through and cope with them at this time. Ambulating

## 2024-05-31 NOTE — L&D DELIVERY NOTE
Delivery Summary:    FIRST STAGE OF LABOR  Louise  Is 34 years old  and came in  for induction of labor for hypertension.   She was completely dilated by 1348         SECOND STAGE OF LABOR      At 1354 hrs she delivered spontaneously . The presentation was occiput anterior  .  A  female  infant was born weighing 6 pounds and 8 ounces and had Apgars of 6 and 8.  Dr. De La Rosa arrived just as the baby was being delivered. He then assumed care.    THIRD STAGE OF LABOR    The placenta delivered at 1405 hours by Pimentel mechanism and was found to be intact with a 3 vessel cord.     There were no laceration.   The cervix and vaginal vault were examined and found to be free of any other significant lacerations.   The final sponge and needle counts were correct and were verified jointly by the RN in the delivery room as well as by me.   The infant was examined and was moving extremities well and color was pink and the exam was reassuring.   The quantitative  blood loss was 50 ml.     At this time mom and infant are in recovery and doing well     CHELA Ledesma MD

## 2024-05-31 NOTE — PROGRESS NOTES
Patient resting comfortably in bed, reports mild 2/10 cramping pain, able to talk through contractions. Vaginal cytotec administered. Ambulated to bathroom once before trying to sleep for a few hours through the night. , moderate variablility, accels present, no decels. Repositioned toco.

## 2024-05-31 NOTE — ANESTHESIA PREPROCEDURE EVALUATION
Anesthesia Pre-Procedure Evaluation    Patient: Louise Branch   MRN: [id number] : 1990        Procedure : * No procedures listed *          Past Medical History:   Diagnosis Date    Anxiety     Heart palpitations     History of postpartum hypertension       Past Surgical History:   Procedure Laterality Date    WISDOM TOOTH EXTRACTION        Allergies   Allergen Reactions    Cephalexin     Codeine     Sulfa Antibiotics     Latex Rash      Social History     Tobacco Use    Smoking status: Never    Smokeless tobacco: Not on file   Substance Use Topics    Alcohol use: Not Currently      Wt Readings from Last 1 Encounters:   24 85.5 kg (188 lb 7 oz)        Anesthesia Evaluation   Pt has had prior anesthetic. Type: MAC, Regional and OB Labor Epidural (hx of accidental dural puncture with previous epidural).    No history of anesthetic complications       ROS/MED HX  ENT/Pulmonary:  - neg pulmonary ROS     Neurologic:  - neg neurologic ROS     Cardiovascular:  - neg cardiovascular ROS     METS/Exercise Tolerance:     Hematologic:  - neg hematologic  ROS     Musculoskeletal:       GI/Hepatic:  - neg GI/hepatic ROS     Renal/Genitourinary:       Endo:  - neg endo ROS     Psychiatric/Substance Use:     (+) psychiatric history anxiety       Infectious Disease:       Malignancy:       Other:            Physical Exam    Airway        Mallampati: II   TM distance: > 3 FB   Neck ROM: full   Mouth opening: > 3 cm    Respiratory Devices and Support         Dental  no notable dental history         Cardiovascular   cardiovascular exam normal          Pulmonary   pulmonary exam normal            OUTSIDE LABS:  CBC:   Lab Results   Component Value Date    WBC 9.6 2024    WBC 8.2 10/26/2023    HGB 11.9 2024    HGB 12.6 10/26/2023    HCT 35.1 2024    HCT 38.3 10/26/2023     2024     10/26/2023     BMP:   Lab Results   Component Value Date     2024     (L) 2023  "   POTASSIUM 4.2 05/30/2024    POTASSIUM 4.3 11/16/2023    CHLORIDE 104 05/30/2024    CHLORIDE 103 11/16/2023    CO2 20 (L) 05/30/2024    CO2 16 (L) 11/16/2023    BUN 5.3 (L) 05/30/2024    BUN 5.1 (L) 11/16/2023    CR 0.60 05/30/2024    CR 0.53 11/16/2023    GLC 90 05/30/2024    GLC 90 11/16/2023     COAGS: No results found for: \"PTT\", \"INR\", \"FIBR\"  POC: No results found for: \"BGM\", \"HCG\", \"HCGS\"  HEPATIC:   Lab Results   Component Value Date    ALBUMIN 3.5 05/30/2024    PROTTOTAL 7.1 05/30/2024    ALT 10 05/30/2024    AST 17 05/30/2024    ALKPHOS 168 (H) 05/30/2024    BILITOTAL 0.2 05/30/2024     OTHER:   Lab Results   Component Value Date    A1C 5.6 10/26/2023    EDDY 9.2 05/30/2024       Anesthesia Plan    ASA Status:  2       Anesthesia Type: Epidural.              Consents    Anesthesia Plan(s) and associated risks, benefits, and realistic alternatives discussed. Questions answered and patient/representative(s) expressed understanding.     - Discussed:     - Discussed with:  Patient            Postoperative Care            Comments:         neg OB ROS.    Virgilio Trejo, APRN CRNA    I have reviewed the pertinent notes and labs in the chart from the past 30 days and (re)examined the patient.  Any updates or changes from those notes are reflected in this note.             "

## 2024-05-31 NOTE — PROGRESS NOTES
Patient able to rest between contractions, rates them 3-4/10 for pain. Contractions not picked up on monitor due to position, continuously readjusted,  seconds, 3-4 min apart. Uterine irritability noted on toco. , moderate variability, accels present, no decels. Dr Benson updated, gave order to give another dose of Cytotec and reassess.

## 2024-05-31 NOTE — PROGRESS NOTES
Patient resting comfortably in bed, reports no pain, talking through contraction. Ambulated in room, once to the bathroom. FHTs 130 moderate variability, accels present, no decels. Repositioned to right side and contractions slowed down. Pitocin increased from 8 to 10 miliunits.

## 2024-05-31 NOTE — PROGRESS NOTES
S: Transfer to postpartum  B: Vaginal birth @ 1354, no tear, formula feeding    A: Mother and baby transferred to postpartum unit at 1530 via ambulatory after completion of immediate recovery period. Patient oriented to room. Mother and baby bonding well and in satisfactory condition upon transfer.  R: Anticipate routine postpartum care.

## 2024-05-31 NOTE — PROGRESS NOTES
Dr. De La Rosa at the bedside. SVE done by provider 3/60%/-3. AROM by provider at 0748 for small amount of clear fluid. Plan to start antibiotics for GBS prophylactic treatment per patient request. Then plan to start pitocin.

## 2024-05-31 NOTE — PLAN OF CARE
S: Delivery  B: induced Labor,  @ [id number] gestation, GBS negative with antibiotic treatment greater than 4 hours prior to delivery.  A: Patient delivered Vaginal at 1354 with Dr. Ledesma and Dr. Holley in attendance. Baby delivered and placed on mother's low abdomen for delayed cord clamping where baby was dried and stimulated. After cord clamped and cut, baby was placed skin to skin on mother's chest within 5 minutes following delivery . Apgars 6/8. Placenta was delivered @ 1404 followed by administration of oxytocin. Bonding initiated with mom and baby. Mother decided to bottle feed formula. See flowsheet for VS and PP checks. Labor care plan goals met.  R: Expect routine postpartum care. Anticipate first feeding within the hour.

## 2024-05-31 NOTE — ANESTHESIA PROCEDURE NOTES
Epidural catheter Procedure Note    Pre-Procedure   Staff -        CRNA: Virgilio Trejo APRN CRNA       Performed By: CRNA       Location: OB       Procedure Start/Stop Times: 5/31/2024 11:10 AM and 5/31/2024 11:29 AM       Pre-Anesthestic Checklist: patient identified, IV checked, risks and benefits discussed, informed consent, monitors and equipment checked, pre-op evaluation, at physician/surgeon's request and post-op pain management  Timeout:       Correct Patient: Yes        Correct Procedure: Yes        Correct Site: Yes        Correct Position: Yes   Procedure Documentation  Procedure: epidural catheter       Diagnosis: INTRATHECAL CATHETER       Patient Position: sitting       Skin prep: Betadine       Local skin infiltrated with 5 mL of 1% lidocaine.        Insertion Site: L2-3. (midline approach).       Technique: LORT air        CHANO at 5.5 cm.       Needle Type: [employer]y needle       Needle Gauge: 17.        Needle Length (Inches): 3.5             2.5 (intrathecal) cm epidural space.         Threaded 8 cm at skin.         # of attempts: 2 and  # of redirects:  1    Assessment/Narrative         Paresthesias: No.       Test dose of 1 mL at 11:29 CDT.        .       Insertion/Infusion Method: LORT air       No aspiration negative for Heme or CSF via Epidural Catheter.       Sensory Level Left: T11.       Sensory Level Right: T11.    Medication(s) Administered   Medication Administration Time: 5/31/2024 11:10 AM     Comments:  Uneventful epidural attempt.  First CHANO at 6cm with saline.  Immediate fast return of clear warm fluid.  Stylet reinserted and 5 ml sterile saline injected into the intrathecal space.  Tuohy removed with plan to attempt epidural at L2/L3 level.  CHANO to air at 5.5 cm, no blood or return of clear fluid.  Catheter easily threaded to 12 cm, but wouldn't advance further.  Instructed patient to take slow deep breaths, as catheter again was attempted to advance, but again unable to advance.   "Catheter withdrawn and immediate return of warm clear fluid noticed.  Stylet reinserted.  Injected 3 ml sterile saline into CSF and re-inserted catheter to a depth of about 13 cm and withdrew Tuohy.  Catheter marking at the skin was just short of 8 cm, presumed 3 cm in intrathecal space.  Test dosed with 1 ml Lidocaine 1.5% with epi, without any hemodynamic changes.  Started gtt at 1 ml/hr and with patient reporting contraction pain of 3/10, down from 8/10 pre-procedure.  Instructed RN and patient about the intrathecal catheter versus epidural.  Pt reports that she is comfortable.      FOR [facility] ([facility]) ONLY:   Pain Team Contact information: please page the Pain Team Via [facility]. Search \"Pain\". During daytime hours, please page the attending first. At night please page the resident first.      "

## 2024-05-31 NOTE — PROGRESS NOTES
S: MD update  B:  here for induction of labor at 39w1d gestation  A: Unable to reach inner os on cervical exam.  with moderate variability. Dr. Benson given update. Contractions slowed down with labor and reports no pain at this time.    R:Orders to stop pitocin and give cytotec overnight. Also orders for Prozac 40mg by mouth once daily, Nifedipine ER 30mg by mouth once daily, and Atenolol 25 mg by mouth once daily.

## 2024-06-01 VITALS
HEART RATE: 72 BPM | SYSTOLIC BLOOD PRESSURE: 131 MMHG | OXYGEN SATURATION: 98 % | TEMPERATURE: 98.3 F | DIASTOLIC BLOOD PRESSURE: 86 MMHG | RESPIRATION RATE: 16 BRPM | BODY MASS INDEX: 34.49 KG/M2 | WEIGHT: 176.6 LBS

## 2024-06-01 PROBLEM — O10.919 CHRONIC HYPERTENSION AFFECTING PREGNANCY: Status: ACTIVE | Noted: 2024-06-01

## 2024-06-01 LAB — HGB BLD-MCNC: 10.9 G/DL (ref 11.7–15.7)

## 2024-06-01 PROCEDURE — 85018 HEMOGLOBIN: CPT | Performed by: FAMILY MEDICINE

## 2024-06-01 PROCEDURE — 36415 COLL VENOUS BLD VENIPUNCTURE: CPT | Performed by: FAMILY MEDICINE

## 2024-06-01 PROCEDURE — 250N000013 HC RX MED GY IP 250 OP 250 PS 637: Performed by: FAMILY MEDICINE

## 2024-06-01 RX ORDER — IBUPROFEN 800 MG/1
800 TABLET, FILM COATED ORAL EVERY 6 HOURS PRN
Qty: 90 TABLET | Refills: 1 | Status: SHIPPED | OUTPATIENT
Start: 2024-06-01

## 2024-06-01 RX ORDER — ACETAMINOPHEN 500 MG
1000 TABLET ORAL EVERY 6 HOURS PRN
Status: SHIPPED
Start: 2024-06-01

## 2024-06-01 RX ADMIN — IBUPROFEN 800 MG: 800 TABLET, FILM COATED ORAL at 04:11

## 2024-06-01 RX ADMIN — DOCUSATE SODIUM 100 MG: 100 CAPSULE, LIQUID FILLED ORAL at 09:19

## 2024-06-01 RX ADMIN — ATENOLOL 25 MG: 25 TABLET ORAL at 09:20

## 2024-06-01 RX ADMIN — ACETAMINOPHEN 650 MG: 325 TABLET, FILM COATED ORAL at 12:38

## 2024-06-01 RX ADMIN — NIFEDIPINE 30 MG: 30 TABLET, EXTENDED RELEASE ORAL at 09:19

## 2024-06-01 RX ADMIN — FLUOXETINE 40 MG: 20 CAPSULE ORAL at 09:20

## 2024-06-01 RX ADMIN — ACETAMINOPHEN 650 MG: 325 TABLET, FILM COATED ORAL at 04:11

## 2024-06-01 ASSESSMENT — ACTIVITIES OF DAILY LIVING (ADL)
ADLS_ACUITY_SCORE: 20
DEPENDENT_IADLS:: INDEPENDENT
ADLS_ACUITY_SCORE: 20

## 2024-06-01 NOTE — DISCHARGE SUMMARY
[facility] Discharge Summary    Louise Branch MRN# [id number]   Age: 34 year old YOB: 1990     Date of Admission:  2024  Date of Discharge::  2024  Admitting Physician:  Andrew De La oRsa MD  Discharge Physician:  Andrew De La Rosa MD, MD     Home clinic: [facility]          Admission Diagnoses:   Encounter for elective induction of labor   (spontaneous vaginal delivery)  Chronic hypertension in pregnancy  Chronic hypertension affecting pregnancy          Discharge Diagnosis:     Normal spontaneous vaginal delivery  Intrauterine pregnancy at 39 2/7 weeks gestation  Chronic HTN stable          Procedures:     Procedure(s): No additional procedures performed            Medications Prior to Admission:     Medications Prior to Admission   Medication Sig Dispense Refill Last Dose    atenolol (TENORMIN) 25 MG tablet Take 1 tablet (25 mg) by mouth daily 30 tablet 4 2024    FLUoxetine (PROZAC) 40 MG capsule Take 1 capsule by mouth every morning   2024    NIFEdipine ER OSMOTIC (PROCARDIA XL) 30 MG 24 hr tablet Take 1 tablet (30 mg) by mouth daily 30 tablet 4 2024    Prenatal Vit-Fe Fumarate-FA (PRENATAL MULTIVITAMIN  PLUS IRON) 27-1 MG TABS Take by mouth daily   2024    [DISCONTINUED] aspirin 81 MG EC tablet Take 1 tablet (81 mg) by mouth daily 30 tablet 4 Past Week    [DISCONTINUED] ondansetron (ZOFRAN) 4 MG tablet TAKE 1 TABLET BY MOUTH EVERY 8 HOURS AS NEEDED FOR NAUSEA 30 tablet 0 2024             Discharge Medications:     Current Discharge Medication List        START taking these medications    Details   acetaminophen 500 MG tablet Take 2 tablets (1,000 mg) by mouth every 6 hours as needed for mild pain or fever (greater than or equal to 38 C /100.4 F (oral) or 38.5 C/ 101.4 F (core).)    Associated Diagnoses:  (spontaneous vaginal delivery)      ibuprofen (ADVIL/MOTRIN) 800 MG tablet Take 1 tablet (800 mg) by mouth  every 6 hours as needed for other (cramping)  Qty: 90 tablet, Refills: 1    Associated Diagnoses:  (spontaneous vaginal delivery)           CONTINUE these medications which have NOT CHANGED    Details   atenolol (TENORMIN) 25 MG tablet Take 1 tablet (25 mg) by mouth daily  Qty: 30 tablet, Refills: 4    Associated Diagnoses: Chronic hypertension in pregnancy      FLUoxetine (PROZAC) 40 MG capsule Take 1 capsule by mouth every morning      NIFEdipine ER OSMOTIC (PROCARDIA XL) 30 MG 24 hr tablet Take 1 tablet (30 mg) by mouth daily  Qty: 30 tablet, Refills: 4    Associated Diagnoses: Chronic hypertension in pregnancy      Prenatal Vit-Fe Fumarate-FA (PRENATAL MULTIVITAMIN  PLUS IRON) 27-1 MG TABS Take by mouth daily           STOP taking these medications       aspirin 81 MG EC tablet Comments:   Reason for Stopping:         ondansetron (ZOFRAN) 4 MG tablet Comments:   Reason for Stopping:                     Consultations:   No consultations were requested during this admission          Brief History of Labor:   Patient with history of chronic HTN and was brought in for a pitocin induction.  She didn't do much of anything dilating on the pitocin so that was stopped the evening of 24 and she received two doses of cytotec which did get her into labor, chinyere much more regular.  She had AROM the following morning and needed only low dose pitocin to augment contractions.  After an epidural which was in the intrathecal space she had relief for about an hour.   The catheter was left in place since they had more than one attempt at her epidural and she had a history of a spinal headache with one of her previous deliveries.  She progressed very quickly and went from 4-5 cm to complete and wanting to push in less than 30 minutes.  I was on my way to the hospital to evaluate her but did not make it for the birth of the baby.  Thankfully, Dr. Ledesma was in house and he stepped in to catch the baby.  I walked into  the room within about a minute of the delivery and took over to deliver the placenta and inspect her perineum.  She had no tears or traumatic injuries.             Hospital Course:   The patient's hospital course was unremarkable.  On discharge, her pain was well controlled. Vaginal bleeding is similar to peak menstrual flow.  Voiding without difficulty.  Ambulating well and tolerating a normal diet.  No fever.  Patient is bottle feeding the baby and that is going well.  Infant is stable.  No bowel movement yet.*  She was discharged on post-partum day #1.    Post-partum hemoglobin:   Hemoglobin   Date Value Ref Range Status   06/01/2024 10.9 (L) 11.7 - 15.7 g/dL Final             Discharge Instructions and Follow-Up:     Discharge diet: Regular   Discharge activity: No lifting more than 20# x 2 wks then increase gradually by 10# per week.  No driving or operating machinery while on narcotic analgesics  Pelvic rest: abstain from intercourse and do not use tampons for 6 week(s)    Discharge follow-up: Follow up with primary care provider in 4 days for a BP recheck, then at 6 weeks   Wound care: Drink plenty of fluids  Ice to area for comfort  Nahed-care per nursing staff           Discharge Disposition:     Discharged to home      Attestation:  I have reviewed today's vital signs, notes, medications, labs and imaging.  Amount of time performed on this discharge summary: 20 minutes.    Electronically signed by:  Andrew De La Rosa M.D.  6/1/2024

## 2024-06-01 NOTE — DISCHARGE INSTRUCTIONS
[facility] Discharge Instructions     Discharge disposition:  Discharged to home       Diet:  Regular       Activity No lifting more than 20# x 2 wks then increase gradually by 10# per week.  No driving or operating machinery while on narcotic analgesics  Pelvic rest: abstain from intercourse and do not use tampons for 6 week(s)        Follow-up: Follow up with primary care provider this Wednesday for a BP recheck then at 6 weeks for her postpartum exam       Additional instructions: Nahed-care per nursing staff      Warning Signs after Having a Baby    Keep this paper on your fridge or somewhere else where you can see it.    Call your provider if you have any of these symptoms up to 12 weeks after having your baby.    Thoughts of hurting yourself or your baby  Pain in your chest or trouble breathing  Severe headache not helped by pain medicine  Eyesight concerns (blurry vision, seeing spots or flashes of light, other changes to eyesight)  Fainting, shaking or other signs of a seizure    Call 9-1-1 if you feel that it is an emergency.     The symptoms below can happen to anyone after giving birth. They can be very serious. Call your provider if you have any of these warning signs.    My provider s phone number: _______________________    Losing too much blood (hemorrhage)    Call your provider if you soak through a pad in less than an hour or pass blood clots bigger than a golf ball. These may be signs that you are bleeding too much.    Blood clots in the legs or lungs    After you give birth, your body naturally clots its blood to help prevent blood loss. Sometimes this increased clotting can happen in other areas of the body, like the legs or lungs. This can block your blood flow and be very dangerous.     Call your provider if you:  Have a red, swollen spot on the back of your leg that is warm or painful when you touch it.   Are coughing up blood.     Infection    Call your provider  if you have any of these symptoms:  Fever of 100.4 F (38 C) or higher.  Pain or redness around your stitches if you had an incision.   Any yellow, white, or green fluid coming from places where you had stitches or surgery.    Mood Problems (postpartum depression)    Many people feel sad or have mood changes after having a baby. But for some people, these mood swings are worse.     Call your provider right away if you feel so anxious or nervous that you can't care for yourself or your baby.    Preeclampsia (high blood pressure)    Even if you didn't have high blood pressure when you were pregnant, you are at risk for the high blood pressure disease called preeclampsia. This risk can last up to 12 weeks after giving birth.     Call your provider if you have:   Pain on your right side under your rib cage  Sudden swelling in the hands and face    Remember: You know your body. If something doesn't feel right, get medical help.     For informational purposes only. Not to replace the advice of your health care provider. Copyright 2020 [facility]. All rights reserved. Clinically reviewed by Bhumika Herrera, RNC-OB, MSN. [place] [id number] - Rev 02/23.

## 2024-06-01 NOTE — PLAN OF CARE
S: Shift review   B:Louise is a  who delivered vaginally on 2024  A: VSS, blood pressure is within the patients normal range, patient is independent with mobility, pain well controlled with as needed tylenol and ibuprofen. Fundus is midline and firm, bleeding is light. Denies any headache or visual changes. Denies any spinal headache, continues to wear the abdominal binder. Handles baby with confidence. Does not need a blood pressure pump, she has one at home. Post partum depression score of 6. Discharge weight completed.   R: Continue with routine PP care

## 2024-06-01 NOTE — PROGRESS NOTES
Per B. Neil, CRNA pt is at an increased risk for spinal headache because there were 2 wet taps that occurred with the placement of the epidural. GUERRERO Trejo wants the epidural catheter to stay in until tomorrow when he rounds on the patient. The patient has sodium chloride 0.9% 1000 ml with cosyntropin 1000 mcg infusion as prophylactic. Pt also has a prn order for butalbital-acetaminophen-caffeine for headache. Neil wants the patient to wear an abdominal binder as tolerated to add pressure to the site.

## 2024-06-01 NOTE — PROGRESS NOTES
S: Discharge  to home    B: Patient had a Vaginal delivery with no complications. Baby girl Baby's name Tesfaye Branch, bottle:  Similac Advance. Support person's name Bal Branch.    A: Assessment and vitals completed by prior nurse. Patient was ready for discharge at shift change.    R: Discharge instructions reviewed and questions answered.  Belongings gathered and returned to the patient. Agreed to follow up in 6 weeks or sooner with any question or concerns.     Nursing Discharge Checklist:    Pneumovax screened and given, if appropriate: N/A  Influenza vaccine screened and given, if appropriate: N/A  Staples removed (): N/A  Breast milk returned: N/A  Hydrogel pads sent home:N/A  Birth Certificate Done: YES  Public Health Referral Made: N/A

## 2024-06-01 NOTE — ANESTHESIA POSTPROCEDURE EVALUATION
Patient: Louise Branch    Procedure: * No procedures listed *       Anesthesia Type:  Epidural    Note:  Disposition: Inpatient   Postop Pain Control: Uneventful            Sign Out: Well controlled pain   PONV: No   Neuro/Psych: Uneventful            Sign Out: Acceptable/Baseline neuro status   Airway/Respiratory: Uneventful            Sign Out: Acceptable/Baseline resp. status   CV/Hemodynamics: Uneventful            Sign Out: Acceptable CV status; No obvious hypovolemia; No obvious fluid overload   Other NRE: NONE   DID A NON-ROUTINE EVENT OCCUR? No    Event details/Postop Comments:  Pt with no complaints of any symptoms of PDPH.  Has been wearing abd binder as much as possible, and pushing the fluids and caffeine.  Pt planning to discharge home today, instructed pt to call hospital with any concerns and go to the ER if PDPH symptoms develop and become unbearable.            Last vitals:  Vitals:    05/31/24 2352 06/01/24 0404 06/01/24 0917   BP: (!) 143/94 134/87 (!) 145/81   Pulse: 72     Resp: 17 16 16   Temp: 98  F (36.7  C) 97.9  F (36.6  C) 98.3  F (36.8  C)   SpO2: 96% 97% 97%       Electronically Signed By: JUANA Castro CRNA  June 1, 2024  11:28 AM

## 2024-06-01 NOTE — PLAN OF CARE
Goal Outcome Evaluation:    S: Shift review   B:Louise is a  who delivered vaginally on 24 at 1354  A: VSS, patient is independent with mobility, pain well controlled with p.o. pain meds. Handles baby with confidence. Patient reported a few small clots (quarter sized) at beginning of writers shift with light flow, since patient hasn't had any clots and bleeding still remains light. Fundus firm, midline and at umbilicus. Patient denies spinal headache, abdominal binder remains in place throughout shift as well as epidural catheter (CRNA request, will come to see patient today and remove).  R: Continue with routine PP care

## 2024-06-03 ENCOUNTER — PATIENT OUTREACH (OUTPATIENT)
Dept: CARE COORDINATION | Facility: CLINIC | Age: 34
End: 2024-06-03
Payer: COMMERCIAL

## 2024-06-03 NOTE — PROGRESS NOTES
"Clinic Care Coordination Contact  Transitions of Care Outreach  Chief Complaint   Patient presents with    Clinic Care Coordination - Post Hospital       Most Recent Admission Date: 2024   Most Recent Admission Diagnosis:      Most Recent Discharge Date: 2024   Most Recent Discharge Diagnosis: Breast feeding status of mother - Z39.1   (spontaneous vaginal delivery) - O80  Chronic hypertension in pregnancy - O10.919     Transitions of Care Assessment    Discharge Assessment  How are you doing now that you are home?: \"I'm good. We're sititng really great.\"  How are your symptoms? (Red Flag symptoms escalate to triage hotline per guidelines): Improved  Do you know how to contact your clinic care team if you have future questions or changes to your health status? : Yes  Does the patient have their discharge instructions? : Yes  Does the patient have questions regarding their discharge instructions? : No  Were you started on any new medications or were there changes to any of your previous medications? : Yes  Does the patient have all of their medications?: Yes  Do you have questions regarding any of your medications? : No  Do you have all of your needed medical supplies or equipment (DME)?  (i.e. oxygen tank, CPAP, cane, etc.): Yes    Post-op ([facility] CTA Only)  If the patient had a surgery or procedure, do they have any questions for a nurse?: No         [facility] Explained and offered Care Coordination support to eligible patients: Yes    Patient accepted? No    Follow up Plan     Discharge Follow-Up  Discharge follow up appointment scheduled in alignment with recommended follow up timeframe or Transitions of Risk Category? (Low = within 30 days; Moderate= within 14 days; High= within 7 days): Yes  Discharge Follow Up Appointment Date: 24  Discharge Follow Up Appointment Scheduled with?: Primary Care Provider    Future Appointments   Date Time Provider Department Center   2024  7:30 AM Niki Camarena, DO " [facility]   6/17/2024  9:00 AM Niki Camarena,  [facility]       Outpatient Plan as outlined on AVS reviewed with patient.    For any urgent concerns, please contact our 24 hour nurse triage line: [phone] ([phone])       SILVIA Hernadez  [phone]  Connected [facility]

## 2024-06-04 NOTE — PROGRESS NOTES
Louise Branch  Gender: female  : 1990  [address] [facility] [address]  [phone] (home)   Medical Record: [id number]  Primary Care Provider: No Ref-Primary, Physician       [facility]   ?   Discharge Phone Call: Key Words/Key Times     How are you and the baby? Doing well    How are feedings going? Going good    Voiding & Stooling? yes    Any questions or concerns? Not at this time    Follow-up appointment? tomorrow      We want to provide excellent care here at [facility]. Do you have any feedback for us that would help us improve? None    Call back COMMENTS:   You all were fantastic

## 2024-06-08 ENCOUNTER — MYC MEDICAL ADVICE (OUTPATIENT)
Dept: FAMILY MEDICINE | Facility: CLINIC | Age: 34
End: 2024-06-08
Payer: COMMERCIAL

## 2024-06-14 DIAGNOSIS — O10.919 CHRONIC HYPERTENSION IN PREGNANCY: ICD-10-CM

## 2024-06-14 RX ORDER — HYDROGEN PEROXIDE 2.65 ML/100ML
81 LIQUID ORAL; TOPICAL DAILY
Qty: 30 TABLET | Refills: 0 | Status: SHIPPED | OUTPATIENT
Start: 2024-06-14

## 2024-06-14 RX ORDER — ATENOLOL 25 MG/1
25 TABLET ORAL DAILY
Qty: 90 TABLET | Refills: 0 | Status: SHIPPED | OUTPATIENT
Start: 2024-06-14 | End: 2024-09-23

## 2024-06-17 DIAGNOSIS — O10.919 CHRONIC HYPERTENSION IN PREGNANCY: ICD-10-CM

## 2024-06-17 RX ORDER — NIFEDIPINE 30 MG/1
TABLET, EXTENDED RELEASE ORAL DAILY
Qty: 30 TABLET | Refills: 0 | Status: SHIPPED | OUTPATIENT
Start: 2024-06-17 | End: 2024-07-24

## 2024-06-18 ENCOUNTER — MYC MEDICAL ADVICE (OUTPATIENT)
Dept: FAMILY MEDICINE | Facility: CLINIC | Age: 34
End: 2024-06-18
Payer: COMMERCIAL

## 2024-06-20 NOTE — TELEPHONE ENCOUNTER
Team: Ok to see baby and mom in same visit. Will need to advise subsequent patients that morning of potential delay due to this. Also putting hold on that day and will keep JARETH spots to accommodate.    RLO

## 2024-06-20 NOTE — TELEPHONE ENCOUNTER
Appointment made for Tesfaye at the same time as moms appointment.  Mother informed.  Azul Call MA

## 2024-06-24 NOTE — TELEPHONE ENCOUNTER
Routing to provider, see patient message asking for an IUD before her post partum check? Please advise when to schedule this?      Doris Omer MA

## 2024-06-25 NOTE — TELEPHONE ENCOUNTER
We can schedule her in the 11:00 slot on July 19th, that should be around 6 weeks postpartum. If she opts for that we need to verify which IUD she wants and have her plan to take Naproxen 500 mg before she comes.

## 2024-07-06 ENCOUNTER — MEDICAL CORRESPONDENCE (OUTPATIENT)
Dept: HEALTH INFORMATION MANAGEMENT | Facility: CLINIC | Age: 34
End: 2024-07-06
Payer: COMMERCIAL

## 2024-07-24 DIAGNOSIS — O10.919 CHRONIC HYPERTENSION IN PREGNANCY: ICD-10-CM

## 2024-07-24 RX ORDER — NIFEDIPINE 30 MG/1
TABLET, EXTENDED RELEASE ORAL DAILY
Qty: 30 TABLET | Refills: 2 | Status: SHIPPED | OUTPATIENT
Start: 2024-07-24 | End: 2024-07-26 | Stop reason: ALTCHOICE

## 2024-07-26 ENCOUNTER — PRENATAL OFFICE VISIT (OUTPATIENT)
Dept: FAMILY MEDICINE | Facility: CLINIC | Age: 34
End: 2024-07-26
Payer: COMMERCIAL

## 2024-07-26 VITALS
BODY MASS INDEX: 32.89 KG/M2 | TEMPERATURE: 97.5 F | DIASTOLIC BLOOD PRESSURE: 84 MMHG | SYSTOLIC BLOOD PRESSURE: 124 MMHG | OXYGEN SATURATION: 99 % | RESPIRATION RATE: 12 BRPM | WEIGHT: 168.4 LBS | HEART RATE: 75 BPM

## 2024-07-26 DIAGNOSIS — Z30.09 COUNSELING FOR BIRTH CONTROL REGARDING INTRAUTERINE DEVICE (IUD): ICD-10-CM

## 2024-07-26 DIAGNOSIS — I10 BENIGN ESSENTIAL HYPERTENSION: ICD-10-CM

## 2024-07-26 DIAGNOSIS — Z30.430 ENCOUNTER FOR INSERTION OF PARAGARD IUD: ICD-10-CM

## 2024-07-26 LAB — HCG UR QL: NEGATIVE

## 2024-07-26 PROCEDURE — 58300 INSERT INTRAUTERINE DEVICE: CPT | Performed by: STUDENT IN AN ORGANIZED HEALTH CARE EDUCATION/TRAINING PROGRAM

## 2024-07-26 PROCEDURE — 81025 URINE PREGNANCY TEST: CPT | Performed by: STUDENT IN AN ORGANIZED HEALTH CARE EDUCATION/TRAINING PROGRAM

## 2024-07-26 PROCEDURE — 99213 OFFICE O/P EST LOW 20 MIN: CPT | Mod: 25 | Performed by: STUDENT IN AN ORGANIZED HEALTH CARE EDUCATION/TRAINING PROGRAM

## 2024-07-26 PROCEDURE — 99207 PR POST PARTUM EXAM: CPT | Performed by: STUDENT IN AN ORGANIZED HEALTH CARE EDUCATION/TRAINING PROGRAM

## 2024-07-26 RX ORDER — COPPER 313.4 MG/1
1 INTRAUTERINE DEVICE INTRAUTERINE ONCE
Status: COMPLETED
Start: 2024-07-26 | End: 2024-07-26

## 2024-07-26 RX ORDER — LOSARTAN POTASSIUM 50 MG/1
50 TABLET ORAL DAILY
Qty: 60 TABLET | Refills: 1 | Status: SHIPPED | OUTPATIENT
Start: 2024-07-26

## 2024-07-26 RX ORDER — COPPER 313.4 MG/1
1 INTRAUTERINE DEVICE INTRAUTERINE ONCE
COMMUNITY

## 2024-07-26 RX ADMIN — COPPER 1 EACH: 313.4 INTRAUTERINE DEVICE INTRAUTERINE at 14:48

## 2024-07-26 NOTE — PROGRESS NOTES
Louise is here for a 6-week postpartum checkup.    She had a  of a viable girl, weight 6 pounds 8 oz., with none complications. Date of delivery was 24. Since delivery, she has not been breast feeding.  She has No signs of infection, bleeding or other complications.  She is not pregnant.  We discussed contraceptions and she has chosen .      Post partum tubal: No  History of Gestational Diabetes? No  Type of Delivery:  Vaginal  Feeding Method:  Formula  If initiated breast feeding and stopped, how long did you breast feed? Did not breastfeed at all, went straight to formula.     Essential htn/Chronic htn:  Desires to go back to losartan now that is post-partum. Is not breast feeding.   Discussed continued monitoring of BP's at home.  Discontinuing Nifedipine and restarting Losartan 50mg every day.   Will send sufficient supply to last until annual wellness with PCP.     REVIEW OF SYSTEMS:  Negative unless otherwise specified per HPI.    Contraception Plan: Parguard IUD    Medical History Reviewed yes - no changes  Family History Reviewed yes - no changes  Problem List Updated yes - no changes    EXAM:  /84   Pulse 75   Temp 97.5  F (36.4  C) (Temporal)   Resp 12   Wt 76.4 kg (168 lb 6.4 oz)   LMP 2023   SpO2 99%   Breastfeeding No   BMI 32.89 kg/m    HEENT: grossly normal.  NECK: no lymphadenopathy or thyroidomegaly.  LUNGS: CTA X 2, no rales or crackles.  BACK: No spinal or CVA tenderness.  HEART: RRR without murmurs clicks or gallops.  ABDOMEN: soft, non tender, good bowel sounds, without masses rebound, guarding or tenderness.  PELVIC:  External genitalia; normal without lesion, repair well healed.     Vagina: normal mucosa and rugae, no discharge.    Cervix: multiparous, well healed, without lesion.    Uterus: non pregnant in size  EXTREMITIES:  warm to touch, good pulses, no ankle edema or calf tenderness.  NEUROLOGIC: grossly normal.    ASSESSMENT:   6-week postpartum exam after  .  IUD Insertion: Parguard IUD for contraception.    Is a pregnancy test required: Yes.  Was it positive or negative?  Negative  Was a consent obtained?  Yes    Subjective: Louise Branch is a 34 year old  presents for IUD and desires Paragard type IUD.    Patient has been given the opportunity to ask questions about all forms of birth control, including all options appropriate for Louise Branch. Discussed that no method of birth control, except abstinence is 100% effective against pregnancy or sexually transmitted infection.     Louise Branch understands she may have the IUD removed at any time. IUD should be removed by a health care provider.    The entire insertion procedure was reviewed with the patient, including care after placement.    Patient's last menstrual period was 2023.  No allergy to betadine or shellfish. Patient declines STD screening  hCG Urine Qualitative   Date Value Ref Range Status   2024 Negative Negative Final     Comment:     This test is for screening purposes.  Results should be interpreted along with the clinical picture.  Confirmation testing is available if warranted by ordering [id number], HCG Quantitative Pregnancy.     /84   Pulse 75   Temp 97.5  F (36.4  C) (Temporal)   Resp 12   Wt 76.4 kg (168 lb 6.4 oz)   LMP 2023   SpO2 99%   Breastfeeding No   BMI 32.89 kg/m      Pelvic Exam:   EG/BUS: normal genital architecture without lesions, erythema or abnormal secretions.   Vagina: moist, pink, rugae with physiologic discharge and secretions  Cervix: multiparous no lesions and pink, moist, closed, without lesion or CMT    PROCEDURE NOTE: -- IUD Insertion  Reason for Insertion: contraception    Not premedicated, but tolerated procedure well.   Under sterile technique, cervix was visualized with speculum and prepped with Betadine solution swab x 3. The uterus was gently sounded to 10.0 cm. IUD prepared for placement, and IUD inserted according  to 's instructions without difficulty or significant resitance, and deployed at the fundus. The strings were visualized and trimmed to 3.0 cm from the external os. Hemostasis noted. Speculum removed.  Patient tolerated procedure well.    EBL: minimal    Complications: none    ASSESSMENT:     ICD-10-CM    1. Routine postpartum follow-up  Z39.2 OB HEMOGLOBIN      2. Counseling for birth control regarding intrauterine device (IUD)  Z30.09 HCG Qual, Urine ([id number])     HCG Qual, Urine ([id number])      3. Encounter for insertion of ParaGard IUD  Z30.430 paragard intrauterine copper device     paragard intrauterine copper IUD device 1 each     INSERTION INTRAUTERINE DEVICE      4. Benign essential hypertension  I10 losartan (COZAAR) 50 MG tablet             PLAN:  (Z39.2) Routine postpartum follow-up  (primary encounter diagnosis)  Exercise encouraged  One-hour glucose tolerance test needed? No  OB HEMOGLOBIN ordered  Follow up in 1 year.    (Z30.09) Counseling for birth control regarding intrauterine device (IUD)  (Z30.430) Encounter for insertion of ParaGard IUD  Given 's handouts, including when to have IUD removed, list of danger s/sx, side effects and follow up recommended. Encouraged condom use for prevention of STD. Back up contraception advised for 7 days if progestin method. Advised to call for any fever, for prolonged or severe pain or bleeding, abnormal vaginal discharge, or unable to palpate strings. She was advised to use pain medications (ibuprofen) as needed for mild to moderate pain. Advised to follow-up in clinic in 4-6 weeks for IUD string check if unable to find strings or as directed by provider.   HCG Qual, Urine ([id number])  paragard intrauterine copper device, paragard         intrauterine copper IUD device 1 each,         INSERTION INTRAUTERINE DEVICE    (I10) Benign essential hypertension  Desires to restart losartan as she is not breast feeding. Restarting prior 50 mg dose and  discontuinf Nifedipine. Recommend monitor BP's and reveisit with PCP at annual exam.   Plan: losartan (COZAAR) 50 MG tablet        Niki Camarena, DO    50 minutes spent by me on the date of the encounter doing chart review, history and exam, documentation and further activities per the note  E5 - 04240

## 2024-07-26 NOTE — PROGRESS NOTES
Louise is here for a 6-week postpartum checkup.    She had a  of a viable girl, weight 6 pounds 8 oz., with none complications. Date of delivery was 24. Since delivery, she has not been breast feeding.  She has No signs of infection, bleeding or other complications.  She is not pregnant.  We discussed contraceptions and she has chosen .      Post partum tubal: No  History of Gestational Diabetes? No  Type of Delivery:  Vaginal  Feeding Method:  Formula  If initiated breast feeding and stopped, how long did you breast feed? Did not breastfeed at all, went straight to formula.     Essential htn/Chronic htn:  Desires to go back to losartan now that is post-partum. Is not breast feeding.   Discussed continued monitoring of BP's at home.  Discontinuing Nifedipine and restarting Losartan 50mg every day.   Will send sufficient supply to last until annual wellness with PCP.     REVIEW OF SYSTEMS:  Negative unless otherwise specified per HPI.    Contraception Plan: Parguard IUD    Medical History Reviewed yes - no changes  Family History Reviewed yes - no changes  Problem List Updated yes - no changes    EXAM:  /84   Pulse 75   Temp 97.5  F (36.4  C) (Temporal)   Resp 12   Wt 76.4 kg (168 lb 6.4 oz)   LMP 2023   SpO2 99%   Breastfeeding No   BMI 32.89 kg/m    HEENT: grossly normal.  NECK: no lymphadenopathy or thyroidomegaly.  LUNGS: CTA X 2, no rales or crackles.  BACK: No spinal or CVA tenderness.  HEART: RRR without murmurs clicks or gallops.  ABDOMEN: soft, non tender, good bowel sounds, without masses rebound, guarding or tenderness.  PELVIC:  External genitalia; normal without lesion, repair well healed.     Vagina: normal mucosa and rugae, no discharge.    Cervix: multiparous, well healed, without lesion.    Uterus: non pregnant in size  EXTREMITIES:  warm to touch, good pulses, no ankle edema or calf tenderness.  NEUROLOGIC: grossly normal.    ASSESSMENT:   6-week postpartum exam after  .    PLAN:    (Z39.2) Routine postpartum follow-up  (primary encounter diagnosis)  Exercise encouraged  One-hour glucose tolerance test needed? No  OB HEMOGLOBIN ordered  Follow up in 1 year.    (Z30.09) Counseling for birth control regarding intrauterine device (IUD)  (Z30.430) Encounter for insertion of ParaGard IUD  Comment: ***    HCG Qual, Urine ([id number])  paragard intrauterine copper device, paragard         intrauterine copper IUD device 1 each,         INSERTION INTRAUTERINE DEVICE    (I10) Benign essential hypertension  Desires to restart losartan as she is not breast feeding. Restarting prior 50 mg dose and discontuinf Nifedipine. Recommend monitor BP's and reveisit with PCP at annual exam.   Plan: losartan (COZAAR) 50 MG tablet        Parguard IUD for contraception.

## 2024-09-22 DIAGNOSIS — O10.919 CHRONIC HYPERTENSION IN PREGNANCY: ICD-10-CM

## 2024-09-23 RX ORDER — ATENOLOL 25 MG/1
25 TABLET ORAL DAILY
Qty: 90 TABLET | Refills: 0 | Status: SHIPPED | OUTPATIENT
Start: 2024-09-23

## 2024-09-26 NOTE — TELEPHONE ENCOUNTER
Sent Shuamet to schedule appt with 3 day reminder to send letter if not read.    Soni Wasserman, CMA

## 2024-12-15 DIAGNOSIS — O10.919 CHRONIC HYPERTENSION IN PREGNANCY: ICD-10-CM

## 2024-12-16 RX ORDER — ATENOLOL 25 MG/1
25 TABLET ORAL DAILY
Qty: 90 TABLET | Refills: 0 | Status: SHIPPED | OUTPATIENT
Start: 2024-12-16

## 2025-01-11 ENCOUNTER — HEALTH MAINTENANCE LETTER (OUTPATIENT)
Age: 35
End: 2025-01-11

## 2025-03-16 DIAGNOSIS — O10.919 CHRONIC HYPERTENSION IN PREGNANCY: ICD-10-CM

## 2025-03-17 RX ORDER — ATENOLOL 25 MG/1
25 TABLET ORAL DAILY
Qty: 90 TABLET | Refills: 0 | Status: SHIPPED | OUTPATIENT
Start: 2025-03-17

## 2025-03-31 ENCOUNTER — MYC REFILL (OUTPATIENT)
Dept: FAMILY MEDICINE | Facility: CLINIC | Age: 35
End: 2025-03-31
Payer: COMMERCIAL

## 2025-03-31 DIAGNOSIS — O10.919 CHRONIC HYPERTENSION IN PREGNANCY: ICD-10-CM

## 2025-03-31 RX ORDER — ATENOLOL 25 MG/1
25 TABLET ORAL DAILY
Qty: 90 TABLET | Refills: 0 | OUTPATIENT
Start: 2025-03-31

## 2025-03-31 RX ORDER — ATENOLOL 25 MG/1
25 TABLET ORAL DAILY
Qty: 90 TABLET | Refills: 0 | Status: SHIPPED | OUTPATIENT
Start: 2025-03-31

## 2025-04-01 NOTE — TELEPHONE ENCOUNTER
March 31, 2025  Niki Camarena, DO to [facility]         3/31/25  7:39 PM  No further refills. Will need to schedule appt with her PCP for future fills.

## 2025-04-01 NOTE — TELEPHONE ENCOUNTER
Sent MyChart to notify patient of refill sent, and provider response per below.     BALAJI HardyN, RN